# Patient Record
Sex: MALE
[De-identification: names, ages, dates, MRNs, and addresses within clinical notes are randomized per-mention and may not be internally consistent; named-entity substitution may affect disease eponyms.]

---
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
## 2017-04-14 NOTE — ED PDOC
HPI: Back


Time Seen by Provider: 17 01:47


Chief Complaint (Nursing): Back Pain


Chief Complaint (Provider): Back Pain


History Per: Patient


History/Exam Limitations: no limitations


Onset/Duration Of Symptoms: Days (x2)


Current Symptoms Are (Timing): Still Present


Previous Symptoms: Back Pain, Neck Pain, Chronic Pain


Associated Symptoms: None


Additional Complaint(s): 


46 year old male presents to ED with complaints of neck pain x2 days and has a 

past medical history of chronic neck and back pain as well as multiple cervical 

disc herniations. Patient notes that the pain is similar to past episodes, and 

that it responds to muscle relaxers. (-) trauma, numbness/tingling of 

extremities, chest pain, cough, fever, SOB, or difficulty ambulating. 





PCP: [facility]





- Risk Factors


AAA Risk Factors: 


   Neg: Older Than 49 Years Of Age





Past Medical History


Reviewed: Historical Data, Nursing Documentation, Vital Signs


Vital Signs: 


 Last Vital Signs











Temp  98.5 F   17 01:40


 


Pulse  75   17 01:40


 


Resp  16   17 01:40


 


BP  144/87   17 01:40


 


Pulse Ox  99   17 01:40














- Medical History


PMH: Bronchitis, Depression, HTN, Hypercholesterolemia, Kidney Stones, Chronic 

Pain (neck and back)


   Denies: Diabetes, Hepatitis, HIV, Chronic Kidney Disease, Seizures, Sexually 

Transmitted Disease





- Family History


Family History: States: Unknown Family Hx, Hypertension (mother, father)





- Home Medications


Home Medications: 


 Ambulatory Orders











 Medication  Instructions  Recorded


 


Escitalopram [Lexapro] 10 mg PO DAILY 03/05/15


 


amLODIPine [Norvasc] 5 mg PO DAILY 03/05/15


 


Azithromycin [Zithromax Z-Ezequiel] 250 mg PO DAILY #1 tab 08/23/15


 


Naproxen 1 tab PO BID PRN #14 tab 16


 


diaZEpam [Valium] 5 mg PO Q6 PRN #8 tab 16


 


Ibuprofen [Motrin] 600 mg PO Q6 #20 tab 10/12/16


 


oxyCODONE/Acetaminophen [Percocet 1 ea PO Q6 PRN #5 tab 10/12/16





5/325 mg Tab]  


 


Dicyclomine [Bentyl] 20 mg PO TID #21 tab 16


 


Ondansetron ODT [Zofran ODT] 4 mg PO Q8 PRN #10 odt 16


 


Cyclobenzaprine [Cyclobenzaprine 10 mg PO TID PRN #20 tab 17





HCl]  


 


Naproxen [Naprosyn] 500 mg PO BID #20 tab 17


 


traMADol [Ultram] 50 mg PO Q8 PRN #12 tab 17


 


Ketorolac Tromethamine [Toradol] 10 mg PO Q6 #20 tab 02/10/17


 


Oseltamivir Phosphate [Tamiflu] 75 mg PO BID 5 Days 02/10/17


 


Tizanidine HCl [Zanaflex] 4 mg PO BID #30 tablet 02/10/17


 


predniSONE [predniSONE Tab] 60 mg PO DAILY #9 tab 02/10/17


 


Cyclobenzaprine [Cyclobenzaprine 10 mg PO BID PRN #10 tab 17





HCl]  


 


Naproxen [Naprosyn] 500 mg PO Q12 PRN #20 tablet 17


 


amLODIPine [Norvasc] 5 mg PO DAILY #30 tab 17


 


Cyclobenzaprine [Cyclobenzaprine 10 mg PO TID PRN #15 tab 17





HCl]  














- Allergies


Allergies/Adverse Reactions: 


 Allergies











Allergy/AdvReac Type Severity Reaction Status Date / Time


 


No Known Allergies Allergy   Verified 02/10/17 19:32














Review of Systems


ROS Statement: Except As Marked, All Systems Reviewed And Found Negative


Constitutional: Negative for: Fever


Cardiovascular: Negative for: Chest Pain


Respiratory: Negative for: Cough, Shortness of Breath


Musculoskeletal: Positive for: Neck Pain


Neurological: Negative for: Numbness (no numbness/tingling to extremities), 

Incoordination





Physical Exam





- Reviewed


Nursing Documentation Reviewed: Yes


Vital Signs Reviewed: Yes





- Physical Exam


Appears: Positive for: Non-toxic, No Acute Distress


Head Exam: Positive for: ATRAUMATIC


Skin: Positive for: Normal Color, Warm, Dry


Eye Exam: Positive for: Normal appearance


Neck: Positive for: Normal


Respiratory: Negative for: Respiratory Distress


Gastrointestinal/Abdominal: Positive for: Soft.  Negative for: Tenderness


Back: Positive for: Normal Inspection.  Negative for: L CVA Tenderness, R CVA 

Tenderness


Extremity: Positive for: Normal ROM.  Negative for: Deformity


Neurologic/Psych: Positive for: Alert, Oriented.  Negative for: Motor/Sensory 

Deficits





- ECG


O2 Sat by Pulse Oximetry: 99 (RA)


Pulse Ox Interpretation: Normal





Medical Decision Making


Medical Decision Makin


Initial impression: chronic neck pain


Initial plan:


* Flexeril 10mg PO


* Toradol 15mg IM


* Re-eval





[id number]


Upon re-evaluation, patient is feeling better. Patient is stable for discharge 

home. Patient has been prescribed Flexeril and was advised to follow up with 

his pain management specialist.


Dx: chronic neck pain








Scribe Attestation:


Documented by Vilma Mccoy acting as a scribe for Jordan Calvillo MD.


   


MD Scribe Attestation:


All medical record entries made by the Scribe were at my direction and 

personally dictated by me. I have reviewed the chart and agree that the record 

accurately reflects my personal performance of the history, physical exam, 

medical decision making, and the department course for this patient. I have 

also personally directed, reviewed, and agree with the discharge instructions 

and disposition.





Disposition





- Clinical Impression


Clinical Impression: 


 Chronic pain





- Disposition


Referrals: 


Klaudia Medina APN [Primary Care Provider] - 


Disposition: Routine/Home


Disposition Time: 02:36


Condition: IMPROVED


Prescriptions: 


Cyclobenzaprine [Cyclobenzaprine HCl] 10 mg PO TID PRN #15 tab


 PRN Reason: neck pain


Instructions:  Chronic Pain (ED)


Print Language: Austrian
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
## 2017-04-30 NOTE — CT
PROCEDURE:  CT HEAD WITHOUT CONTRAST.



HISTORY:

head injury



COMPARISON:

None available. 



TECHNIQUE:

Axial computed tomography images were obtained through the head/brain 

without intravenous contrast.  



Radiation dose:



Total exam DLP = 880 mGy-cm.



This CT exam was performed using one or more of the following dose 

reduction techniques: Automated exposure control, adjustment of the 

mA and/or kV according to patient size, and/or use of iterative 

reconstruction technique.



FINDINGS:



HEMORRHAGE:

No intracranial hemorrhage. 



BRAIN:

No mass effect or edema.  No atrophy or chronic microvascular 

ischemic changes.



VENTRICLES:

Unremarkable. No hydrocephalus. 



CALVARIUM:

Unremarkable.



PARANASAL SINUSES:

Unremarkable as visualized. No significant inflammatory changes.



MASTOID AIR CELLS:

Unremarkable as visualized. No inflammatory changes.



OTHER FINDINGS:

None.



IMPRESSION:

Normal CT of the Head.
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
## 2017-05-02 NOTE — RAD
PROCEDURE:  Cervical Spine Radiographs. Multiple views of the 

cervical spine performed. Note the study is limited due to 

obscuration of the distal tip of the odontoid by overlying occiput in 

the open-mouth projection. In addition, there is a incomplete 

visualization of the lower cervical segments in the lateral 

projection due to overlying shoulder artifact. 



HISTORY:

Pain. 



COMPARISON:

No dedicated prior cervical spine studies are available however 

correlation made with MRI of the thoracic spine 07/24/2015 which did 

image the cervical spine on sagittal T1  counting series 



FINDINGS:



BONES:

No acute compression fractures no retropulsed fragments. Multilevel 

degenerative spondylosis with varying degrees of disc space narrowing 

large partially bridging/partially bridging anterior osteophyte 

formation.  There also appears to be anterior vertebral body fusion 

changes of the C5 and C6 segments felt to be degenerative in origin 

however clinical correlation with surgical history recommended. Exit 

foramina are poorly seen on the right side due to poor patient 

positioning.  Left-sided exit foramina appear adequate of. Note also 

that odontoid tip is partially obscured by overlying occiput in the 

open-mouth projection 



DISC SPACES:

Normal. 



SOFT TISSUES:

Normal. No prevertebral soft tissue swelling. 



OTHER FINDINGS:

None.



IMPRESSION:

Limited study as above. . Multilevel degenerative spondylosis with 

large anterior bridging osteophyte formation and fusion of the C5 and 

C6 vertebral body segments felt to be degenerative in origin however 

clinical correlation with surgical history recommended. 



If symptoms persist or occult fracture suspected clinically recommend 

followup CT scan of the cervical spine.

## 2017-12-06 NOTE — ED PDOC
HPI: General Adult


Chief Complaint (Nursing): ENT Problem


Chief Complaint (Provider): ENT Problem


History Per: Patient


Onset/Duration Of Symptoms: Other (x 2 weeks)


Additional Complaint(s): 





Juan is a 47 year old male with a past medical history of bipolar disorder who 

was accompanied by wife presents to the Emergency Department complaining of 

left ear pain for 2 weeks. Patient states he used hydrogen peroxide for a few 

days. Patient is also complaining of lower back pain-states he has 2-3 

herniated discs, was discharged from pain [facility] in the past and has had trouble 

getting Rx for stronger pain control from medical professional. denies 

radiation of pain to lower extremity, urinary incontinence or bowel 

incontinence denies abd pain  Patient denies fever, throat pain, neck pain, 

abdominal pain, dysuria and hematuria. 











PMD: Wilmer Mejia





Past Medical History


Reviewed: Historical Data, Nursing Documentation, Vital Signs


Vital Signs: 


 Last Vital Signs











Temp  99.2 F   12/06/17 13:14


 


Pulse  90   12/06/17 13:14


 


Resp  20   12/06/17 13:14


 


BP  127/80   12/06/17 13:14


 


Pulse Ox  97   12/06/17 15:02














- Medical History


PMH: Bronchitis, Depression, HTN, Hypercholesterolemia, Kidney Stones, Chronic 

Pain (neck and back)


   Denies: Diabetes, Hepatitis, HIV, Chronic Kidney Disease, Seizures, Sexually 

Transmitted Disease





- Surgical History


Surgical History: No Surg Hx





- Family History


Family History: States: Unknown Family Hx, Hypertension (mother, father)





- Immunization History


Hx Tetanus Toxoid Vaccination: Yes





- Home Medications


Home Medications: 


 Ambulatory Orders











 Medication  Instructions  Recorded


 


Escitalopram [Lexapro] 10 mg PO DAILY 03/05/15


 


amLODIPine [Norvasc] 5 mg PO DAILY 03/05/15


 


Azithromycin [Zithromax Z-Ezequiel] 250 mg PO DAILY #1 tab 08/23/15


 


Naproxen 1 tab PO BID PRN #14 tab 08/18/16


 


diaZEpam [Valium] 5 mg PO Q6 PRN #8 tab 08/18/16


 


Ibuprofen [Motrin] 600 mg PO Q6 #20 tab 10/12/16


 


oxyCODONE/Acetaminophen [Percocet 1 ea PO Q6 PRN #5 tab 10/12/16





5/325 mg Tab]  


 


Dicyclomine [Bentyl] 20 mg PO TID #21 tab 12/29/16


 


Ondansetron ODT [Zofran ODT] 4 mg PO Q8 PRN #10 odt 12/29/16


 


Cyclobenzaprine [Cyclobenzaprine 10 mg PO TID PRN #20 tab 01/22/17





HCl]  


 


Naproxen [Naprosyn] 500 mg PO BID #20 tab 01/22/17


 


traMADol [Ultram] 50 mg PO Q8 PRN #12 tab 02/01/17


 


Ketorolac Tromethamine [Toradol] 10 mg PO Q6 #20 tab 02/10/17


 


Oseltamivir Phosphate [Tamiflu] 75 mg PO BID 5 Days  capsule 02/10/17


 


Tizanidine HCl [Zanaflex] 4 mg PO BID #30 tablet 02/10/17


 


predniSONE [predniSONE Tab] 60 mg PO DAILY #9 tab 02/10/17


 


Cyclobenzaprine [Cyclobenzaprine 10 mg PO BID PRN #10 tab 03/16/17





HCl]  


 


Naproxen [Naprosyn] 500 mg PO Q12 PRN #20 tablet 03/16/17


 


amLODIPine [Norvasc] 5 mg PO DAILY #30 tab 03/16/17


 


traMADol [Ultram] 50 mg PO TID PRN #12 tab 04/14/17


 


Naproxen [Naprosyn] 500 mg PO BID PRN #20 tablet 04/30/17


 


traMADol [Ultram] 50 mg PO QID #10 tab 04/30/17


 


Cyclobenzaprine [Cyclobenzaprine 10 mg PO BID #14 tab 05/01/17





HCl]  


 


Ibuprofen [Motrin] 400 mg PO Q6 #30 tab 05/01/17


 


Ciprofloxacin/Dexamethasone 1 - 2 drop OD BID #1 bottle 12/06/17





[Ciprodex Otic]  














- Allergies


Allergies/Adverse Reactions: 


 Allergies











Allergy/AdvReac Type Severity Reaction Status Date / Time


 


No Known Allergies Allergy   Verified 05/01/17 20:36














Review of Systems


ROS Statement: Except As Marked, All Systems Reviewed And Found Negative


Constitutional: Negative for: Fever


ENT: Positive for: Ear Pain.  Negative for: Throat Pain


Gastrointestinal: Negative for: Abdominal Pain


Musculoskeletal: Positive for: Back Pain (Chronic Lower).  Negative for: Neck 

Pain





Physical Exam





- Reviewed


Nursing Documentation Reviewed: Yes


Vital Signs Reviewed: Yes





- Physical Exam


ENT: Positive for: Other (See comments)


Back: Positive for: Muscle Spasm, Other (Mild Paraspinal Tenderness Lumbar Spine

).  Negative for: L CVA Tenderness, R CVA Tenderness


Lymphatic: Negative for: Other (Lymph Node Tenderness)


Neurologic/Psych: Positive for: Alert


Comments: 





Ears:


Left- Otorrhea, Non-Tender Pinea Tragus





- ECG


O2 Sat by Pulse Oximetry: 97 (RA)


Pulse Ox Interpretation: Normal





Medical Decision Making


Medical Decision Making: 





Time: 13:59


Plan:


Patient was agitated and was verbally aggressive to nursing staff. Security 

involved and pt was verbally redirected. 





PT given:


- Toradol 30 mg IM STAT








Time: 14:00





Upon provider evaluation patient is medically stable, and requires no further 

treatment in the ED at this time. Patient will be discharged with Rx for 

Ciprodex Otic of otitis externa and advised to f.u with pain mgnt. 


advised that chronic pain needs to be managed with a pain specialist. 


pt provided understanding. 





Counseling was provided and all questions were answered regarding diagnosis. 

There is agreement to discharge plan. Return if symptoms persist or worsen.





--------------------------------------------------------------------------------

-----------------


Scribe Attestation:


Documented by Josh Mistry, acting as a scribe for Cristina Mena PA-C





Provider Scribe Attestation:


All medical record entries made by the Scribe were at my direction and 

personally dictated by me. I have reviewed the chart and agree that the record 

accurately reflects my personal performance of the history, physical exam, 

medical decision making, and the department course for this patient. I have 

also personally directed, reviewed, and agree with the discharge instructions 

and disposition.





Disposition





- Clinical Impression


Clinical Impression: 


 Left ear pain, Chronic pain








- Disposition


Referrals: 


Formerly [facility], [facility] [Outside]


Disposition: Routine/Home


Disposition Time: 17:24


Condition: STABLE


Prescriptions: 


Ciprofloxacin/Dexamethasone [Ciprodex Otic] 1 - 2 drop OD BID #1 bottle


Instructions:  Earache (ED), Back Pain (ED)


Forms:  [facility] (English)

## 2018-01-31 NOTE — RAD
EXAM:

  XR Cervical Spine, 6 or More Views



CLINICAL HISTORY:

  47 years old, male; Pain; Neck pain



TECHNIQUE:

  Frontal, lateral, oblique and flexion/extension views of the cervical spine.



COMPARISON:

  CR - CERVICAL SPINE COMPLETE 2017-05-01 21:43



FINDINGS:

  Vertebrae:  There are large bridging anterior osteophytes at multiple levels 

from C2-C6. No acute fracture from C1 to the top of C7. C7 is partially 

visualized on the swimmer's view.  Normal alignment.

  Disc spaces: There is fusion of the C5 and 6 vertebral bodies.

  Soft tissues:  Unremarkable. No prevertebral soft tissue swelling.



IMPRESSION:     

No acute fracture or dislocation. Degenerative changes as described.

## 2018-01-31 NOTE — ED PDOC
HPI: Back


Time Seen by Provider: 01/31/18 05:39


Chief Complaint (Nursing): Back Pain


Chief Complaint (Provider): Neck Pain, Back Pain


History Per: Patient


History/Exam Limitations: no limitations


Onset/Duration Of Symptoms: Days (x 2 weeks)


Current Symptoms Are (Timing): Still Present


Previous Symptoms: Back Pain


Additional Complaint(s): 


 Juan Perry is a 47-year-old male with a past medical history of multiple 

herniated discs and chronic back pain, now presenting with neck pain radiating 

down his back. Denies any recent re-injury. He states the pain feels as if it 

is spreading upwards. Denies any focal weakness or numbness. 





PMD: Dr. Anita Stewart 





Past Medical History


Reviewed: Historical Data, Nursing Documentation, Vital Signs


Vital Signs: 


 Last Vital Signs











Temp  98.1 F   01/31/18 05:47


 


Pulse  70   01/31/18 05:47


 


Resp  18   01/31/18 05:47


 


BP  131/83   01/31/18 05:47


 


Pulse Ox  99   01/31/18 05:47














- Medical History


PMH: Bronchitis, Depression, HTN, Hypercholesterolemia, Kidney Stones, Chronic 

Pain (neck and back)


   Denies: Diabetes, Hepatitis, HIV, Chronic Kidney Disease, Seizures, Sexually 

Transmitted Disease





- Surgical History


Surgical History: No Surg Hx





- Family History


Family History: States: Unknown Family Hx, Hypertension (mother, father)





- Social History


Current smoker - smoking cessation education provided: No


Ex-Smoker (has not smoked in the last 12 months): Yes


Alcohol: Social


Drugs: Denies





- Immunization History


Hx Tetanus Toxoid Vaccination: Yes





- Home Medications


Home Medications: 


 Ambulatory Orders











 Medication  Instructions  Recorded


 


Escitalopram [Lexapro] 10 mg PO DAILY 03/05/15


 


amLODIPine [Norvasc] 5 mg PO DAILY 03/05/15


 


Azithromycin [Zithromax Z-Ezequiel] 250 mg PO DAILY #1 tab 08/23/15


 


Naproxen 1 tab PO BID PRN #14 tab 08/18/16


 


diaZEpam [Valium] 5 mg PO Q6 PRN #8 tab 08/18/16


 


Ibuprofen [Motrin] 600 mg PO Q6 #20 tab 10/12/16


 


oxyCODONE/Acetaminophen [Percocet 1 ea PO Q6 PRN #5 tab 10/12/16





5/325 mg Tab]  


 


Dicyclomine [Bentyl] 20 mg PO TID #21 tab 12/29/16


 


Ondansetron ODT [Zofran ODT] 4 mg PO Q8 PRN #10 odt 12/29/16


 


Cyclobenzaprine [Cyclobenzaprine 10 mg PO TID PRN #20 tab 01/22/17





HCl]  


 


Naproxen [Naprosyn] 500 mg PO BID #20 tab 01/22/17


 


traMADol [Ultram] 50 mg PO Q8 PRN #12 tab 02/01/17


 


Ketorolac Tromethamine [Toradol] 10 mg PO Q6 #20 tab 02/10/17


 


Oseltamivir Phosphate [Tamiflu] 75 mg PO BID 5 Days  capsule 02/10/17


 


Tizanidine HCl [Zanaflex] 4 mg PO BID #30 tablet 02/10/17


 


predniSONE [predniSONE Tab] 60 mg PO DAILY #9 tab 02/10/17


 


Cyclobenzaprine [Cyclobenzaprine 10 mg PO BID PRN #10 tab 03/16/17





HCl]  


 


Naproxen [Naprosyn] 500 mg PO Q12 PRN #20 tablet 03/16/17


 


amLODIPine [Norvasc] 5 mg PO DAILY #30 tab 03/16/17


 


traMADol [Ultram] 50 mg PO TID PRN #12 tab 04/14/17


 


Naproxen [Naprosyn] 500 mg PO BID PRN #20 tablet 04/30/17


 


traMADol [Ultram] 50 mg PO QID #10 tab 04/30/17


 


Cyclobenzaprine [Cyclobenzaprine 10 mg PO BID #14 tab 05/01/17





HCl]  


 


Ibuprofen [Motrin] 400 mg PO Q6 #30 tab 05/01/17


 


Ciprofloxacin/Dexamethasone 1 - 2 drop OD BID #1 bottle 12/06/17





[Ciprodex Otic]  


 


Cyclobenzaprine [Cyclobenzaprine 10 mg PO BID #15 tab 01/31/18





HCl]  


 


Ketorolac Tromethamine [Toradol] 10 mg PO BID #20 tab 01/31/18














- Allergies


Allergies/Adverse Reactions: 


 Allergies











Allergy/AdvReac Type Severity Reaction Status Date / Time


 


No Known Allergies Allergy   Verified 05/01/17 20:36














Review of Systems


ROS Statement: Except As Marked, All Systems Reviewed And Found Negative


Musculoskeletal: Positive for: Neck Pain, Back Pain


Neurological: Negative for: Weakness, Numbness (and tingling)





Physical Exam





- Reviewed


Nursing Documentation Reviewed: Yes


Vital Signs Reviewed: Yes





- Physical Exam


Appears: Positive for: Non-toxic, No Acute Distress


Head Exam: Positive for: ATRAUMATIC, NORMOCEPHALIC


Skin: Positive for: Normal Color, Warm, Dry


Eye Exam: Positive for: EOMI, Normal appearance, PERRL


Neck: Positive for: Pain On Movement Of Neck


Cardiovascular/Chest: Positive for: Regular Rate, Rhythm.  Negative for: Murmur


Respiratory: Positive for: Normal Breath Sounds.  Negative for: Accessory 

Muscle Use, Respiratory Distress


Gastrointestinal/Abdominal: Positive for: Normal Exam, Soft.  Negative for: 

Tenderness


Back: Positive for: Other (paravertebral muscular tenderness on palpation at 

cervical, thoracic, and lumbar regions).  Negative for: Vertebral Tenderness


Extremity: Positive for: Normal ROM, Other (no saddle anesthesia, sensation 

intact).  Negative for: Deformity


Neurologic/Psych: Positive for: Alert, Oriented.  Negative for: Motor/Sensory 

Deficits





- ECG


O2 Sat by Pulse Oximetry: 99 (RA)


Pulse Ox Interpretation: Normal





Medical Decision Making


Medical Decision Making: 


Time: 5:59


Initial Impression: Musculoskeletal back pain 


Plan:


--X-Ray Cervical Spine


--Flexeril 10 mg PO


--Toradol 30 mg IM





X-Ray C-Spine:


FINDINGS:


Vertebrae: There are large bridging anterior osteophytes at multiple levels 

from C2-C6. No acute


fracture from C1 to the top of C7. C7 is partially visualized on the swimmer's 

view. Normal alignment.


Disc spaces: There is fusion of the C5 and 6 vertebral bodies.


Soft tissues: Unremarkable. No prevertebral soft tissue swelling.





IMPRESSION:


No acute fracture or dislocation. Degenerative changes as described.


Thank you for allowing us to participate in the care of your patient.


Dictated and Authenticated by: Monse Gudino MD


01/31/2018 6:55 AM Eastern Time ([place] & [place])








Patient is medically stable for discharge home. Will d/c with Rx for Toradol 

and Flexeril. Counseling was provided and all questions were answered regarding 

diagnosis and need for follow up with PMD. There is agreement to discharge 

plan. Return if symptoms persist or worsen.





--------------------------------------------------------------------------------

-----------------


Scribe Attestation:


Documented by Bette Sewell, acting as a scribe for Ayden Barber MD





Provider Scribe Attestation:


All medical record entries made by the Scribe were at my direction and 

personally dictated by me. I have reviewed the chart and agree that the record 

accurately reflects my personal performance of the history, physical exam, 

medical decision making, and the department course for this patient. I have 

also personally directed, reviewed, and agree with the discharge instructions 

and disposition. 





Disposition





- Clinical Impression


Clinical Impression: 


 Upper back strain








- Patient ED Disposition


Is Patient to be Admitted: No


Counseled Patient/Family Regarding: Studies Performed, Diagnosis, Need For 

Followup, Rx Given





- Disposition


Referrals: 


Anita Reinoso ARNP [Primary Care Provider] - 


Disposition: Routine/Home


Disposition Time: 06:59


Condition: STABLE


Prescriptions: 


Cyclobenzaprine [Cyclobenzaprine HCl] 10 mg PO BID #15 tab


Ketorolac Tromethamine [Toradol] 10 mg PO BID #20 tab


Instructions:  Cervical Strain (DC), Degenerative Disc Disease (ED)


Forms:  [facility] (English)


Print Language: Chilean





- POA


Present On Arrival: None

## 2018-02-12 ENCOUNTER — HOSPITAL ENCOUNTER (EMERGENCY)
Dept: HOSPITAL 14 - H.ER | Age: 48
Discharge: HOME | End: 2018-02-12
Payer: COMMERCIAL

## 2018-02-12 VITALS
DIASTOLIC BLOOD PRESSURE: 85 MMHG | SYSTOLIC BLOOD PRESSURE: 133 MMHG | OXYGEN SATURATION: 98 % | HEART RATE: 84 BPM | TEMPERATURE: 97.8 F | RESPIRATION RATE: 20 BRPM

## 2018-02-12 VITALS — BODY MASS INDEX: 35.4 KG/M2

## 2018-02-12 DIAGNOSIS — M54.2: ICD-10-CM

## 2018-02-12 DIAGNOSIS — M54.9: Primary | ICD-10-CM

## 2018-02-12 PROCEDURE — 96372 THER/PROPH/DIAG INJ SC/IM: CPT

## 2018-02-12 PROCEDURE — 99282 EMERGENCY DEPT VISIT SF MDM: CPT

## 2018-02-12 PROCEDURE — 82948 REAGENT STRIP/BLOOD GLUCOSE: CPT
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
## 2018-03-12 ENCOUNTER — HOSPITAL ENCOUNTER (OUTPATIENT)
Dept: HOSPITAL 14 - H.OPSURG | Age: 48
Discharge: HOME | End: 2018-03-12
Attending: ANESTHESIOLOGY
Payer: COMMERCIAL

## 2018-03-12 VITALS
SYSTOLIC BLOOD PRESSURE: 141 MMHG | HEART RATE: 76 BPM | TEMPERATURE: 97.6 F | DIASTOLIC BLOOD PRESSURE: 90 MMHG | OXYGEN SATURATION: 98 %

## 2018-03-12 VITALS — BODY MASS INDEX: 38.9 KG/M2

## 2018-03-12 VITALS — RESPIRATION RATE: 18 BRPM

## 2018-03-12 DIAGNOSIS — M54.16: Primary | ICD-10-CM

## 2018-03-12 PROCEDURE — 62323 NJX INTERLAMINAR LMBR/SAC: CPT

## 2018-03-12 RX ADMIN — HYDROMORPHONE HYDROCHLORIDE PRN MG: 1 INJECTION, SOLUTION INTRAMUSCULAR; INTRAVENOUS; SUBCUTANEOUS at 13:50

## 2018-03-12 RX ADMIN — HYDROMORPHONE HYDROCHLORIDE PRN MG: 1 INJECTION, SOLUTION INTRAMUSCULAR; INTRAVENOUS; SUBCUTANEOUS at 14:15

## 2018-03-12 NOTE — RAD
PROCEDURE:  Intraoperative Fluoroscopy. 



HISTORY:

PAIN MANAGEMENT



FINDINGS:

Fluoroscopic assistance was provided. 34.2 seconds fluoroscopy time 

utilized during this procedure.  Radiation dose = 9.67 mGy 



Please refer to the operative report from JOSE Montoya.

## 2018-03-12 NOTE — OP
PROCEDURE DATE: 3/12/18



PREOPERATIVE DIAGNOSIS:  Lumbar radiculopathy.



POSTOPERATIVE DIAGNOSIS:  Lumbar radiculopathy.



PROCEDURE:  Caudal epidural with x-ray guidance.



EXPECTED BLOOD LOSS:  None.



COMPLICATIONS:  None.



DESCRIPTION OF PROCEDURE:  After informed consent was obtained, the patient

was brought to the OR and placed on the table in prone position.  All

pressure points were padded, sedation was administered by anesthesia. 

Lumbosacral spine was prepped with iodine x3 and draped in normal sterile

fashion.  A 3 mL of 1% lidocaine was used to create a skin wheal using a

25-gauge needle.  A 22-gauge Tuohy epidural needle was advanced under

lateral view to rocha the sacrococcygeal ligament.  There was no

paresthesia during placement of the needle.  After negative aspiration for

heme or CSF, 2 mL of contrast was used to delineate the epidural space. 

After negative aspiration for heme or CSF, 20 mL of 0.5% lidocaine and

0.25% bupivacaine with Depo-Medrol was easily instilled.  An 80 mg of

Depo-Medrol was used for the case.  There was no paresthesia during the

case.  The patient was taken to recovery room with bilateral lower

extremity motor and sensory intact.







__________________________________________

Jose De Jesus Brown MD





DD:  03/12/2018 13:29:56

DT:  03/12/2018 15:04:41

Job # [id number]





EULALIO

## 2018-03-14 ENCOUNTER — HOSPITAL ENCOUNTER (EMERGENCY)
Dept: HOSPITAL 14 - H.ER | Age: 48
Discharge: HOME | End: 2018-03-14
Payer: COMMERCIAL

## 2018-03-14 VITALS
DIASTOLIC BLOOD PRESSURE: 93 MMHG | HEART RATE: 91 BPM | TEMPERATURE: 98.2 F | SYSTOLIC BLOOD PRESSURE: 157 MMHG | RESPIRATION RATE: 16 BRPM | OXYGEN SATURATION: 99 %

## 2018-03-14 VITALS — BODY MASS INDEX: 38.9 KG/M2

## 2018-03-14 DIAGNOSIS — Z87.442: ICD-10-CM

## 2018-03-14 DIAGNOSIS — M54.2: ICD-10-CM

## 2018-03-14 DIAGNOSIS — M47.817: ICD-10-CM

## 2018-03-14 DIAGNOSIS — I10: ICD-10-CM

## 2018-03-14 DIAGNOSIS — G89.29: ICD-10-CM

## 2018-03-14 DIAGNOSIS — Z86.59: ICD-10-CM

## 2018-03-14 DIAGNOSIS — M54.5: Primary | ICD-10-CM

## 2018-03-14 LAB
ALBUMIN SERPL-MCNC: 4.7 G/DL (ref 3.5–5)
ALBUMIN/GLOB SERPL: 1.3 {RATIO} (ref 1–2.1)
ALT SERPL-CCNC: 28 U/L (ref 21–72)
AST SERPL-CCNC: 38 U/L (ref 17–59)
BASOPHILS # BLD AUTO: 0.1 K/UL (ref 0–0.2)
BASOPHILS NFR BLD: 0.9 % (ref 0–2)
BUN SERPL-MCNC: 24 MG/DL (ref 9–20)
CALCIUM SERPL-MCNC: 9.3 MG/DL (ref 8.4–10.2)
EOSINOPHIL # BLD AUTO: 0 K/UL (ref 0–0.7)
EOSINOPHIL NFR BLD: 0.5 % (ref 0–4)
ERYTHROCYTE [DISTWIDTH] IN BLOOD BY AUTOMATED COUNT: 13.5 % (ref 11.5–14.5)
GFR NON-AFRICAN AMERICAN: > 60
HGB BLD-MCNC: 14.3 G/DL (ref 12–18)
LYMPHOCYTES # BLD AUTO: 1.8 K/UL (ref 1–4.3)
LYMPHOCYTES NFR BLD AUTO: 26.2 % (ref 20–40)
MCH RBC QN AUTO: 30.9 PG (ref 27–31)
MCHC RBC AUTO-ENTMCNC: 33.7 G/DL (ref 33–37)
MCV RBC AUTO: 91.8 FL (ref 80–94)
MONOCYTES # BLD: 0.4 K/UL (ref 0–0.8)
MONOCYTES NFR BLD: 6.1 % (ref 0–10)
NEUTROPHILS # BLD: 4.6 K/UL (ref 1.8–7)
NEUTROPHILS NFR BLD AUTO: 66.3 % (ref 50–75)
NRBC BLD AUTO-RTO: 0.1 % (ref 0–0)
PLATELET # BLD: 240 K/UL (ref 130–400)
PMV BLD AUTO: 8.2 FL (ref 7.2–11.7)
RBC # BLD AUTO: 4.63 MIL/UL (ref 4.4–5.9)
WBC # BLD AUTO: 6.9 K/UL (ref 4.8–10.8)

## 2018-03-14 PROCEDURE — 85025 COMPLETE CBC W/AUTO DIFF WBC: CPT

## 2018-03-14 PROCEDURE — 96374 THER/PROPH/DIAG INJ IV PUSH: CPT

## 2018-03-14 PROCEDURE — 99282 EMERGENCY DEPT VISIT SF MDM: CPT

## 2018-03-14 PROCEDURE — 85651 RBC SED RATE NONAUTOMATED: CPT

## 2018-03-14 PROCEDURE — 80053 COMPREHEN METABOLIC PANEL: CPT
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
## 2018-10-08 ENCOUNTER — HOSPITAL ENCOUNTER (EMERGENCY)
Dept: HOSPITAL 14 - H.ER | Age: 48
Discharge: HOME | End: 2018-10-08
Payer: COMMERCIAL

## 2018-10-08 VITALS
DIASTOLIC BLOOD PRESSURE: 83 MMHG | HEART RATE: 90 BPM | RESPIRATION RATE: 18 BRPM | TEMPERATURE: 99.6 F | OXYGEN SATURATION: 99 % | SYSTOLIC BLOOD PRESSURE: 139 MMHG

## 2018-10-08 VITALS — BODY MASS INDEX: 38.9 KG/M2

## 2018-10-08 DIAGNOSIS — J06.9: Primary | ICD-10-CM

## 2018-10-08 DIAGNOSIS — G89.29: ICD-10-CM

## 2018-10-08 DIAGNOSIS — I10: ICD-10-CM

## 2018-10-08 DIAGNOSIS — E78.00: ICD-10-CM

## 2018-10-08 NOTE — RAD
Date of service: 



10/08/2018



HISTORY:

 productive cough r/o pneumonia 



COMPARISON:

08/22/2015



TECHNIQUE:

Chest PA and lateral



FINDINGS:



LUNGS:

No active pulmonary disease.



PLEURA:

No significant pleural effusion identified. No pneumothorax apparent.



CARDIOVASCULAR:

 No radiographic findings to suggest acute or significant 

cardiovascular disease.



OSSEOUS STRUCTURES:

No significant abnormalities.



VISUALIZED UPPER ABDOMEN:

Normal.



OTHER FINDINGS:

None.



IMPRESSION:

No active disease. No significant interval change compared to the 

prior examination(s).

## 2018-10-08 NOTE — ED PDOC
HPI: CCC, URI, Sore Throat


Chief Complaint (Provider): Cough


History Per: Patient


History/Exam Limitations: no limitations


Onset/Duration Of Symptoms: Days (x4)


Current Symptoms Are (Timing): Still Present


Additional Complaint(s): 


48 year old male presents to the ED for evaluation of a cough productive of 

yellow sputum for the last four days associated with subjective fevers and back 

pain described as a burning feeling. He notes the back pain is only while 

coughing and does not have it at rest. Reports taking Mucinex every six hours 

which has helped with sinus congestion. Otherwise denies chills, shortness of 

breath, chest pain, abdominal pain, nausea, vomiting, diarrhea, rash, and 

urinary sx.





PMD: none provided





<Baylee Landin - Last Filed: 10/09/18 23:50>





<Maggy Jones - Last Filed: 10/10/18 17:11>


Time Seen by Provider: 10/08/18 16:24


Chief Complaint (Nursing): Flu-like Symptoms





Supervising Attending Note





- Attestation:


I have personally seen and examined this patient.: No


I have reviewed all pertinent clinical information, including history, physical 

exam and plan: Yes





<Maggy Jones - Last Filed: 10/10/18 17:11>





Past Medical History


Reviewed: Historical Data, Nursing Documentation, Vital Signs


Vital Signs: 





                                Last Vital Signs











Temp  99.6 F   10/08/18 15:56


 


Pulse  90   10/08/18 15:56


 


Resp  18   10/08/18 15:56


 


BP  139/83   10/08/18 15:56


 


Pulse Ox  99   10/08/18 15:56














- Medical History


PMH: Bronchitis, Depression, HTN, Hypercholesterolemia, Kidney Stones, Chronic 

Pain (neck and back)


   Denies: Diabetes, Hepatitis, HIV, Chronic Kidney Disease, Seizures, Sexually 

Transmitted Disease





- Surgical History


Other surgeries: right ankle





- Family History


Family History: States: Unknown Family Hx, Hypertension (mother, father)





- Social History


Ex-Smoker (has not smoked in the last 12 months): Yes


Alcohol: Social


Drugs: Denies





- Immunization History


Hx Tetanus Toxoid Vaccination: Yes





<Baylee Landin - Last Filed: 10/09/18 23:50>


Vital Signs: 





                                Last Vital Signs











Temp  99.6 F   10/08/18 15:56


 


Pulse  90   10/08/18 15:56


 


Resp  18   10/08/18 15:56


 


BP  139/83   10/08/18 15:56


 


Pulse Ox  99   10/09/18 23:51














<Maggy Jones - Last Filed: 10/10/18 17:11>





- Home Medications


Home Medications: 


                                Ambulatory Orders











 Medication  Instructions  Recorded


 


RX: traMADol [Ultram] 50 mg PO Q8 PRN #12 tab 02/01/17


 


Cyclobenzaprine [Cyclobenzaprine 10 mg PO BID PRN #10 tab 03/16/17





HCl]  


 


Acetaminophen with Codeine 1 tab PO Q6 PRN #10 tab 02/12/18





[Tylenol with Codeine No. 3 300  





mg-30 mg]  


 


Alprazolam [Xanax] 0.5 mg PO DAILY 03/12/18


 


Ciprofloxacin/Dexamethasone 1 - 2 drop OD BID PRN 03/12/18





[Ciprodex Otic]  


 


Hydrochlorothiazide [Microzide] 12.5 mg PO DAILY 03/12/18


 


RX: Losartan Potassium 50 mg PO DAILY 03/12/18


 


Benzonatate [Tessalon Perle] 100 mg PO Q8H PRN #21 capsule 10/08/18


 


RX: Albuterol HFA [Ventolin HFA 90 2 puff IH Q6H PRN #60 puff 10/08/18





mcg/actuation (8 g)]  


 


Atorvastatin [Lipitor] 40 mg PO 10/10/18


 


Escitalopram [Lexapro] 10 mg PO Q12H 10/10/18














- Allergies


Allergies/Adverse Reactions: 


                                    Allergies











Allergy/AdvReac Type Severity Reaction Status Date / Time


 


No Known Allergies Allergy   Verified 10/09/18 17:27














Review of Systems


ROS Statement: Except As Marked, All Systems Reviewed And Found Negative


Constitutional: Positive for: Fever (subjective).  Negative for: Chills


ENT: Positive for: Nose Congestion


Cardiovascular: Negative for: Chest Pain


Respiratory: Negative for: Shortness of Breath


Gastrointestinal: Negative for: Nausea, Vomiting, Abdominal Pain, Diarrhea


Genitourinary Male: Negative for: Dysuria, Frequency


Musculoskeletal: Positive for: Back Pain (while coughing)


Skin: Negative for: Rash





<Baylee Landin - Last Filed: 10/09/18 23:50>





Physical Exam





- Reviewed


Nursing Documentation Reviewed: Yes


Vital Signs Reviewed: Yes





- Physical Exam


Appears: Positive for: Well, Non-toxic, No Acute Distress (no respiratory 

distress)


Head Exam: Positive for: ATRAUMATIC, NORMOCEPHALIC


Skin: Positive for: Normal Color, Warm, Dry


Eye Exam: Positive for: Normal appearance


ENT: Positive for: Normal ENT Inspection


Neck: Positive for: Normal, Painless ROM, Supple


Cardiovascular/Chest: Positive for: Regular Rate, Rhythm


Respiratory: Positive for: Wheezing (scattered diffuse expiratory).  Negative 

for: Respiratory Distress


Gastrointestinal/Abdominal: Positive for: Normal Exam, Soft.  Negative for: 

Tenderness


Back: Positive for: Normal Inspection


Neurologic/Psych: Positive for: Alert, Oriented (x3)





<Baylee Landin - Last Filed: 10/09/18 23:50>





- ECG


O2 Sat by Pulse Oximetry: 99 (RA)


Pulse Ox Interpretation: Normal





<Baylee Landin - Last Filed: 10/09/18 23:50>





Medical Decision Making


Medical Decision Making: 


Time: 1642


Initial Impression: acute bronchitis


Initial Plan:


--CXR


--Duoneb 9ml INH


--Ibuprofen 400mg PO


--Peak flow pre/post tx





CXR negative as read by me.





1850


Pt reevaled after Duoneb and he reports reduction in cough and less difficulty 

taking deep breaths. Given tessalon perles and an albuterol inhaler and advised 

to increase fluid intake and continue Mucinex. Instructed to follow up with PMD 

or return to ED if sx worsen.





 

--------------------------------------------------------------------------------


-----------------


Scribe Attestation:


Documented by Mary Anne Reyes, acting as a scribe for Baylee Landin PA-C.


Provider Scribe Attestation:


All medical record entries made by the Scribe were at my direction and 

personally dictated by me. I have reviewed the chart and agree that the record 

accurately reflects my personal performance of the history, physical exam, 

medical decision making, and the department course for this patient. I have also

 personally directed, reviewed, and agree with the discharge instructions and 

disposition.





<Baylee Landin - Last Filed: 10/09/18 23:50>





Disposition





- Patient ED Disposition


Is Patient to be Admitted: No





- Disposition


Disposition: Routine/Home


Disposition Time: 18:45





<Baylee Landin - Last Filed: 10/09/18 23:50>





<Maggy Jones - Last Filed: 10/10/18 17:11>





- Clinical Impression


Clinical Impression: 


 Viral URI








- Disposition


Referrals: 


[facility] [Outside]


Condition: IMPROVED


Additional Instructions: 


Take cough medicine once every 8 hours as needed for cough


Use inahler 2 puffs every 4-6 hours as needed 


Take tylenol/ibuprofen for headache


increase fluids


Followup with primary doctor within 2 days


Return to ED if symptoms persist or worsen


Prescriptions: 


RX: Albuterol HFA [Ventolin HFA 90 mcg/actuation (8 g)] 2 puff IH Q6H PRN #60 

puff


 PRN Reason: Cough


Benzonatate [Tessalon Perle] 100 mg PO Q8H PRN #21 capsule


 PRN Reason: Cough


Forms:  [facility] (English), [facility] ED School/Work Excuse

## 2018-10-09 ENCOUNTER — HOSPITAL ENCOUNTER (INPATIENT)
Dept: HOSPITAL 14 - H.ER | Age: 48
LOS: 2 days | Discharge: HOME | DRG: 97 | End: 2018-10-11
Attending: FAMILY MEDICINE | Admitting: FAMILY MEDICINE
Payer: COMMERCIAL

## 2018-10-09 VITALS — BODY MASS INDEX: 38.9 KG/M2

## 2018-10-09 DIAGNOSIS — E78.00: ICD-10-CM

## 2018-10-09 DIAGNOSIS — E78.5: ICD-10-CM

## 2018-10-09 DIAGNOSIS — H91.90: ICD-10-CM

## 2018-10-09 DIAGNOSIS — R55: ICD-10-CM

## 2018-10-09 DIAGNOSIS — I10: ICD-10-CM

## 2018-10-09 DIAGNOSIS — F32.9: ICD-10-CM

## 2018-10-09 DIAGNOSIS — G89.29: ICD-10-CM

## 2018-10-09 DIAGNOSIS — F41.9: ICD-10-CM

## 2018-10-09 DIAGNOSIS — Z23: ICD-10-CM

## 2018-10-09 DIAGNOSIS — Z87.891: ICD-10-CM

## 2018-10-09 DIAGNOSIS — J20.9: Primary | ICD-10-CM

## 2018-10-09 DIAGNOSIS — Z86.711: ICD-10-CM

## 2018-10-09 DIAGNOSIS — Z86.718: ICD-10-CM

## 2018-10-09 DIAGNOSIS — J32.0: ICD-10-CM

## 2018-10-09 LAB
ALBUMIN SERPL-MCNC: 4.5 G/DL (ref 3.5–5)
ALBUMIN/GLOB SERPL: 1.3 {RATIO} (ref 1–2.1)
ALT SERPL-CCNC: 45 U/L (ref 21–72)
AST SERPL-CCNC: 55 U/L (ref 17–59)
BASE EXCESS BLDV CALC-SCNC: 0.6 MMOL/L (ref 0–2)
BASE EXCESS BLDV CALC-SCNC: 4 MMOL/L (ref 0–2)
BASOPHILS # BLD AUTO: 0 K/UL (ref 0–0.2)
BASOPHILS NFR BLD: 0.5 % (ref 0–2)
BUN SERPL-MCNC: 17 MG/DL (ref 9–20)
CALCIUM SERPL-MCNC: 9.3 MG/DL (ref 8.4–10.2)
EOSINOPHIL # BLD AUTO: 0.1 K/UL (ref 0–0.7)
EOSINOPHIL NFR BLD: 1.6 % (ref 0–4)
ERYTHROCYTE [DISTWIDTH] IN BLOOD BY AUTOMATED COUNT: 13.6 % (ref 11.5–14.5)
GFR NON-AFRICAN AMERICAN: > 60
HGB BLD-MCNC: 14.4 G/DL (ref 12–18)
LYMPHOCYTES # BLD AUTO: 1 K/UL (ref 1–4.3)
LYMPHOCYTES NFR BLD AUTO: 25 % (ref 20–40)
MCH RBC QN AUTO: 31.4 PG (ref 27–31)
MCHC RBC AUTO-ENTMCNC: 34.1 G/DL (ref 33–37)
MCV RBC AUTO: 92.2 FL (ref 80–94)
MONOCYTES # BLD: 0.5 K/UL (ref 0–0.8)
MONOCYTES NFR BLD: 11.4 % (ref 0–10)
NEUTROPHILS # BLD: 2.6 K/UL (ref 1.8–7)
NEUTROPHILS NFR BLD AUTO: 61.5 % (ref 50–75)
NRBC BLD AUTO-RTO: 0 % (ref 0–0)
PCO2 BLDV: 45 MMHG (ref 40–60)
PCO2 BLDV: 47 MMHG (ref 40–60)
PH BLDV: 7.36 [PH] (ref 7.32–7.43)
PH BLDV: 7.42 [PH] (ref 7.32–7.43)
PLATELET # BLD: 217 K/UL (ref 130–400)
PMV BLD AUTO: 8.1 FL (ref 7.2–11.7)
RBC # BLD AUTO: 4.59 MIL/UL (ref 4.4–5.9)
VENOUS BLOOD FIO2: 21 %
VENOUS BLOOD FIO2: 28 %
VENOUS BLOOD GAS PO2: 16 MM/HG (ref 30–55)
VENOUS BLOOD GAS PO2: 23 MM/HG (ref 30–55)
WBC # BLD AUTO: 4.2 K/UL (ref 4.8–10.8)

## 2018-10-09 NOTE — ED PDOC
HPI: Influenza


Time Seen by Provider: 10/09/18 17:34


Chief Complaint: Cough, Cold, Congestion


Chief Complaint (Provider): Cough, Cold, Congestion


History Per: Patient


Exam Limitations: no limitations


Have you had recent travel within the past 21 days to any of: No


Onset/Duration Of Symptoms: Days (x7 days ago )


Symptoms include: fever (tactile), cough (productive cough with yellow sputum ).

 denies: vomiting, diarrhea, chest pain, rash


Additional complaint(s):: 





Juan Perry is a 48 year old male with a past medical history of DVT, p

ulmonary embolism, leg pain, and HTN, who presents to the emergency department 

complaining of a tactile fever with chills and productive cough with yellow 

sputum, onset x7 days ago. Patient was seen in ED yesterday and had a chest x-

ray taken. He was given an albuterol pump, but symptoms worsened yesterday so he

came to the ED today. He reports of having a decreased appetite and denies 

having chest pain, hemoptysis, rash, abdominal pain, nausea, vomiting, diarrhea,

sick contact, or travel. 





PMD: Kassandra Roque





Past Medical History


Reviewed: Historical Data, Nursing Documentation, Vital Signs


Vital Signs: 


                                Last Vital Signs











Temp  99.3 F   10/09/18 18:30


 


Pulse  86   10/09/18 18:22


 


Resp  20   10/09/18 18:22


 


BP  98/60 L  10/09/18 18:22


 


Pulse Ox  98   10/09/18 18:22














- Medical History


PMH: Bronchitis, Depression, HTN, Hypercholesterolemia, Kidney Stones, Chronic 

Pain (neck and back)


   Denies: Diabetes, Hepatitis, HIV, Chronic Kidney Disease, Seizures, Sexually 

Transmitted Disease





- Surgical History


Surgical History: No Surg Hx





- Family History


Family History: States: Hypertension (mother, father)





- Immunization History


Hx Tetanus Toxoid Vaccination: Yes





- Home Medications


Home Medications: 


                                Ambulatory Orders











 Medication  Instructions  Recorded


 


RX: traMADol [Ultram] 50 mg PO Q8 PRN #12 tab 02/01/17


 


Cyclobenzaprine [Cyclobenzaprine 10 mg PO BID PRN #10 tab 03/16/17





HCl]  


 


Acetaminophen with Codeine 1 tab PO Q6 PRN #10 tab 02/12/18





[Tylenol with Codeine No. 3 300  





mg-30 mg]  


 


Alprazolam [Xanax] 0.5 mg PO DAILY 03/12/18


 


Ciprofloxacin/Dexamethasone 1 - 2 drop OD BID PRN 03/12/18





[Ciprodex Otic]  


 


Hydrochlorothiazide [Microzide] 12.5 mg PO DAILY 03/12/18


 


RX: Losartan Potassium 50 mg PO DAILY 03/12/18


 


Benzonatate [Tessalon Perle] 100 mg PO Q8H PRN #21 capsule 10/08/18


 


RX: Albuterol HFA [Ventolin HFA 90 2 puff IH Q6H PRN #60 puff 10/08/18





mcg/actuation (8 g)]  














- Allergies


Allergies/Adverse Reactions: 


                                    Allergies











Allergy/AdvReac Type Severity Reaction Status Date / Time


 


No Known Allergies Allergy   Verified 10/09/18 17:27














Review of Systems


ROS Statement: Except As Marked, All Systems Reviewed And Found Negative


Constitutional: Positive for: Fever (tactile ), Chills


Cardiovascular: Negative for: Chest Pain


Respiratory: Positive for: Cough, Sputum (yellow ).  Negative for: Hemoptysis


Gastrointestinal: Positive for: Other (decreased appetite ).  Negative for: 

Nausea, Vomiting, Abdominal Pain, Diarrhea


Skin: Negative for: Rash





Physical Exam





- Reviewed


Nursing Documentation Reviewed: Yes


Vital Signs Reviewed: Yes





- Physical Exam


Appears: Positive for: Well, No Acute Distress


Head Exam: Positive for: ATRAUMATIC, NORMOCEPHALIC


Skin: Positive for: Normal Color, Warm, Dry


Eye Exam: Positive for: Normal appearance, EOMI, PERRL


ENT: Positive for: Normal ENT Inspection


Neck: Positive for: Normal, Painless ROM, Supple


Cardiovascular/Chest: Positive for: Regular Rate, Rhythm


Respiratory: Positive for: Wheezing (bilateral expiratory wheezing).  Negative 

for: Respiratory Distress ((+) able to speak in full sentences)


Gastrointestinal/Abdominal: Positive for: Normal Exam, Bowel Sounds, Soft.  

Negative for: Tenderness


Back: Positive for: Normal Inspection.  Negative for: L CVA Tenderness, R CVA Te

nderness, Vertebral Tenderness


Extremity: Positive for: Normal ROM.  Negative for: Tenderness, Deformity





Medical Decision Making


Medical Decision Making: 





Initial Time: 17:43


Initial Plan: 


--VBG shock panel 2.3 


--EKG


--CMP


--Troponin I 


--Chest Portable [RAD]


--Duoneb 3 mg/0.5 gm (3 ml) UD


--Solu-mendrol 125 mg IV once 


-- Sodium Chloride 1000 ml IV


--Zofran 4 mg IVP


--Blood culture


--Cardiac monitor 


--Saline lock





As the RN was attempting to place IV, patient began to feel dizzy and turned 

diaphoretic and felt like he was gonna pass out. Patient did not have any loss 

of consciousness. Repeat BP: 98/60, temp: 99.3. 


Dr. James recommends adding troponin and placing patient on cardiac monitor. 


--VBG shock panel 2.3 


--Sodium chloride 1000 ml IV 





Repeat VBG remains 2.3. Case d/w Dr. Calvillo who recommends Levaquin IV and 23 

hour observation


Case d/w Dr. Hameed, [facility] on call, and arrangements made for 23 hour telemetry

 observation. Pt. informed of plan. States dizziness has improved but still 

present. Repeat neuro exam remains non-focal. Antivert 50mg PO, CT head w/o 

contrast ordered.


CT: no ICH; b/l maxillary sinusitis. 





 

--------------------------------------------------------------------------------


--------------------------------------   


Scribe Attestation:


Documented by Lars Otoole, acting as a scribe for García CHOI





Provider Scribe Attestation:


All medical record entries made by the Scribe were at my direction and 

personally dictated by me. I have reviewed the chart and agree that the record 

accurately reflects my personal performance of the history, physical exam, 

medical decision making, and the department course for this patient. I have also

 personally directed, reviewed, and agree with the discharge instructions and 

disposition.





- Laboratory Results


Result Diagrams: 


                                 10/09/18 18:43





                                 10/09/18 18:58





- ECG


O2 Sat by Pulse Oximetry: 98





- Progress


ED Course And Treament: 





As RN was attempting to place IV, patient began to feel dizzy and turned 

diaphoretic and felt like he was gonna pass out. Patient did not lose 

consciousness. 





Dr. James recommends adding troponin and placin patient on cardiac monitor. 





Provider ordered an additional bolus. 





Disposition





- Clinical Impression


Clinical Impression: 


 Pre-syncope, Bronchitis








- Patient ED Disposition


Is Patient to be Admitted: Yes





- Disposition


Disposition Time: 19:00


Condition: FAIR

## 2018-10-10 LAB
ALBUMIN SERPL-MCNC: 4.4 G/DL (ref 3.5–5)
ALBUMIN/GLOB SERPL: 1.3 {RATIO} (ref 1–2.1)
ALT SERPL-CCNC: 48 U/L (ref 21–72)
AST SERPL-CCNC: 56 U/L (ref 17–59)
BUN SERPL-MCNC: 14 MG/DL (ref 9–20)
CALCIUM SERPL-MCNC: 9.4 MG/DL (ref 8.4–10.2)
ERYTHROCYTE [DISTWIDTH] IN BLOOD BY AUTOMATED COUNT: 13.4 % (ref 11.5–14.5)
GFR NON-AFRICAN AMERICAN: > 60
HDLC SERPL-MCNC: 35 MG/DL (ref 30–70)
HGB BLD-MCNC: 13.8 G/DL (ref 12–18)
LDLC SERPL-MCNC: 81 MG/DL (ref 0–129)
MCH RBC QN AUTO: 31.5 PG (ref 27–31)
MCHC RBC AUTO-ENTMCNC: 34 G/DL (ref 33–37)
MCV RBC AUTO: 92.5 FL (ref 80–94)
PLATELET # BLD: 234 K/UL (ref 130–400)
RBC # BLD AUTO: 4.38 MIL/UL (ref 4.4–5.9)
WBC # BLD AUTO: 2.4 K/UL (ref 4.8–10.8)

## 2018-10-10 PROCEDURE — 3E0234Z INTRODUCTION OF SERUM, TOXOID AND VACCINE INTO MUSCLE, PERCUTANEOUS APPROACH: ICD-10-PCS | Performed by: FAMILY MEDICINE

## 2018-10-10 RX ADMIN — IPRATROPIUM BROMIDE AND ALBUTEROL SULFATE SCH ML: .5; 3 SOLUTION RESPIRATORY (INHALATION) at 19:14

## 2018-10-10 RX ADMIN — IPRATROPIUM BROMIDE AND ALBUTEROL SULFATE PRN ML: .5; 3 SOLUTION RESPIRATORY (INHALATION) at 10:11

## 2018-10-10 RX ADMIN — IPRATROPIUM BROMIDE AND ALBUTEROL SULFATE SCH ML: .5; 3 SOLUTION RESPIRATORY (INHALATION) at 13:02

## 2018-10-10 RX ADMIN — METHYLPREDNISOLONE SODIUM SUCCINATE SCH MG: 40 INJECTION, POWDER, FOR SOLUTION INTRAMUSCULAR; INTRAVENOUS at 21:05

## 2018-10-10 RX ADMIN — FLUTICASONE PROPIONATE AND SALMETEROL SCH PUFF: 50; 250 POWDER RESPIRATORY (INHALATION) at 21:05

## 2018-10-10 RX ADMIN — PANTOPRAZOLE SODIUM SCH MG: 40 TABLET, DELAYED RELEASE ORAL at 12:05

## 2018-10-10 RX ADMIN — FLUTICASONE PROPIONATE AND SALMETEROL SCH PUFF: 50; 250 POWDER RESPIRATORY (INHALATION) at 12:02

## 2018-10-10 RX ADMIN — METHYLPREDNISOLONE SODIUM SUCCINATE SCH MG: 40 INJECTION, POWDER, FOR SOLUTION INTRAMUSCULAR; INTRAVENOUS at 12:03

## 2018-10-10 NOTE — CARD
--------------- APPROVED REPORT --------------





Date of service: 10/09/2018



EKG Measurement

Heart [id number]

HI [id number]

[id number]

[id number]

[id number]



<Conclusion>

Normal sinus rhythm

Normal ECG
--------------- APPROVED REPORT --------------





Date of service: 10/10/2018



EKG Measurement

Heart [id number]

NC [id number]

[id number]

[id number]

[id number]



<Conclusion>

Normal sinus rhythm

Normal ECG
Detail Level: Detailed

## 2018-10-10 NOTE — CP.PCM.HP
History of Present Illness





- History of Present Illness


History of Present Illness: 





48 year old male with pmhx of HTN, HLD, Depression, and anxiety presented to ED 

with complaints of productive cough, subjective fever and chills x 1 week. 

Patient reports to intermitted cough x 3 weeks. In the ED, patient felt dizzy an

d turned diaphoretic but did not lose consciousness.  Patient was admitted 

yesterday for acute bronchitis and presyncopal episodes. Denies any chest pain, 

dyspnea, headache, nausea, vomiting, abdominal pain or  complaints. Denies any

drugs uses.





ROS: all 12 systems reviewed and negative except as mentioned in HPI





PMHx: HTN, HLD, Depression, Anxiety





Surgical Hx: Ankle Surgery in 1998





Social Hx: Denies smoking cigarettes, drinking alcohol or using drugs.





Family History : Mother: Asthma  Father: Multiple Stroke





Allergies: NKDA





Medications: reviewed





ED Course: EKG NSR, head CT, CXR : neg, NS X 3, duoneb, solumdrol IM and IV 

levaquin





Present on Admission





- Present on Admission


Any Indicators Present on Admission: No





Review of Systems





- Review of Systems


All systems: reviewed and no additional remarkable complaints except





Past Patient History





- Infectious Disease


Hx of Infectious Diseases: None





- Past Medical History & Family History


Past Medical History?: Yes





- Past Social History


Smoking Status: Former Smoker





- CARDIAC


Hx Hypercholesterolemia: Yes


Hx Hypertension: Yes





- PULMONARY


Hx Bronchitis: Yes


Hx Pulmonary Embolism: Yes


Hx Respiratory Tract Infection: Yes





- NEUROLOGICAL


Hx Seizures: No





- HEENT


Hx HEENT Problems: Yes


Hx Deafness: Yes





- RENAL


Hx Chronic Kidney Disease: No


Hx Kidney Stones: Yes





- ENDOCRINE/METABOLIC


Hx Endocrine Disorders: No





- HEMATOLOGICAL/ONCOLOGICAL


Hx Human Immunodeficiency Virus (HIV): No





- INTEGUMENTARY


Hx Dermatological Problems: No





- MUSCULOSKELETAL/RHEUMATOLOGICAL


Hx Musculoskeletal Disorders: Yes


Hx Back Pain: Yes


Hx Falls: No





- GASTROINTESTINAL


Hx Gastrointestinal Disorders: No





- GENITOURINARY/GYNECOLOGICAL


Hx Sexually Transmitted Disorders: No





- PSYCHIATRIC


Hx Depression: Yes


Hx Substance Use: Yes





- SURGICAL HISTORY


Hx Surgeries: No


Other/Comment: right ankle





- ANESTHESIA


Hx Anesthesia: Yes


Hx Anesthesia Reactions: No


Hx Malignant Hyperthermia: No





Meds


Allergies/Adverse Reactions: 


                                    Allergies











Allergy/AdvReac Type Severity Reaction Status Date / Time


 


No Known Allergies Allergy   Verified 10/09/18 17:27














Physical Exam





- Constitutional


Appears: Non-toxic, No Acute Distress





- Head Exam


Head Exam: NORMAL INSPECTION, NORMOCEPHALIC





- Eye Exam


Eye Exam: EOMI, Normal appearance





- ENT Exam


ENT Exam: Mucous Membranes Moist





- Neck Exam


Neck exam: Positive for: Normal Inspection.  Negative for: Meningismus





- Respiratory Exam


Respiratory Exam: Decreased Breath Sounds, NORMAL BREATHING PATTERN.  absent: 

Accessory Muscle Use, Respiratory Distress


Additional comments: 





scattered expiratory wheezing





- Cardiovascular Exam


Cardiovascular Exam: REGULAR RHYTHM, RRR, +S1, +S2





- GI/Abdominal Exam


GI & Abdominal Exam: Normal Bowel Sounds, Soft.  absent: Tenderness





- Extremities Exam


Extremities exam: Positive for: normal inspection.  Negative for: calf 

tenderness





- Back Exam


Back exam: absent: CVA tenderness (L), CVA tenderness (R)





- Neurological Exam


Neurological exam: Alert, CN II-XII Intact, Oriented x3





- Psychiatric Exam


Psychiatric exam: Normal Affect, Normal Mood





- Skin


Skin Exam: Normal Color, Warm





Results





- Vital Signs


Recent Vital Signs: 





                                Last Vital Signs











Temp  97.9 F   10/10/18 09:41


 


Pulse  86   10/10/18 10:13


 


Resp  20   10/10/18 09:41


 


BP  135/82   10/10/18 09:59


 


Pulse Ox  98   10/10/18 09:41














- Labs


Result Diagrams: 


                                 10/10/18 04:40





                                 10/10/18 04:40


Labs: 





                         Laboratory Results - last 24 hr











  10/09/18 10/09/18 10/09/18





  18:26 18:43 18:43


 


WBC   4.2 L 


 


RBC   4.59 


 


Hgb   14.4 


 


Hct   42.3 


 


MCV   92.2 


 


MCH   31.4 H 


 


MCHC   34.1 


 


RDW   13.6 


 


Plt Count   217 


 


MPV   8.1 


 


Neut % (Auto)   61.5 


 


Lymph % (Auto)   25.0 


 


Mono % (Auto)   11.4 H 


 


Eos % (Auto)   1.6 


 


Baso % (Auto)   0.5 


 


Neut # (Auto)   2.6 


 


Lymph # (Auto)   1.0 


 


Mono # (Auto)   0.5 


 


Eos # (Auto)   0.1 


 


Baso # (Auto)   0.0 


 


pO2    16 L


 


VBG pH    7.42


 


VBG pCO2    45


 


VBG HCO3    26.0


 


VBG Total CO2    30.6 H


 


VBG O2 Sat (Calc)    31.7 L


 


VBG Base Excess    4.0 H


 


VBG Potassium    4.1


 


Sodium    132.0


 


Chloride    98.0


 


Glucose    125 H


 


Lactate    2.3 H


 


FiO2    21.0


 


Potassium   


 


Carbon Dioxide   


 


Anion Gap   


 


BUN   


 


Creatinine   


 


Est GFR ( Amer)   


 


Est GFR (Non-Af Amer)   


 


POC Glucose (mg/dL)  122 H  


 


Random Glucose   


 


Lactic Acid   


 


Calcium   


 


Total Bilirubin   


 


AST   


 


ALT   


 


Alkaline Phosphatase   


 


Troponin I   


 


Total Protein   


 


Albumin   


 


Globulin   


 


Albumin/Globulin Ratio   


 


Triglycerides   


 


Cholesterol   


 


LDL Cholesterol Direct   


 


HDL Cholesterol   


 


Venous Blood Potassium    4.1


 


Urine Opiates Screen   


 


Urine Methadone Screen   


 


Ur Barbiturates Screen   


 


Ur Phencyclidine Scrn   


 


Ur Amphetamines Screen   


 


U Benzodiazepines Scrn   


 


U Oth Cocaine Metabols   


 


U Cannabinoids Screen   














  10/09/18 10/09/18 10/09/18





  18:58 18:58 20:19


 


WBC   


 


RBC   


 


Hgb   


 


Hct   


 


MCV   


 


MCH   


 


MCHC   


 


RDW   


 


Plt Count   


 


MPV   


 


Neut % (Auto)   


 


Lymph % (Auto)   


 


Mono % (Auto)   


 


Eos % (Auto)   


 


Baso % (Auto)   


 


Neut # (Auto)   


 


Lymph # (Auto)   


 


Mono # (Auto)   


 


Eos # (Auto)   


 


Baso # (Auto)   


 


pO2    23 L


 


VBG pH    7.36


 


VBG pCO2    47


 


VBG HCO3    23.8


 


VBG Total CO2    28.0


 


VBG O2 Sat (Calc)    43.3


 


VBG Base Excess    0.6


 


VBG Potassium    4.0


 


Sodium  137   135.0


 


Chloride  97 L   100.0


 


Glucose    187 H


 


Lactate    2.3 H


 


FiO2    28.0


 


Potassium  4.3  


 


Carbon Dioxide  27  


 


Anion Gap  17  


 


BUN  17  


 


Creatinine  1.0  


 


Est GFR ( Amer)  > 60  


 


Est GFR (Non-Af Amer)  > 60  


 


POC Glucose (mg/dL)   


 


Random Glucose  117 H  


 


Lactic Acid   


 


Calcium  9.3  


 


Total Bilirubin  0.4  


 


AST  55  


 


ALT  45  


 


Alkaline Phosphatase  77  


 


Troponin I   < 0.0120 


 


Total Protein  7.9  


 


Albumin  4.5  


 


Globulin  3.4  


 


Albumin/Globulin Ratio  1.3  


 


Triglycerides   


 


Cholesterol   


 


LDL Cholesterol Direct   


 


HDL Cholesterol   


 


Venous Blood Potassium    4.0


 


Urine Opiates Screen   


 


Urine Methadone Screen   


 


Ur Barbiturates Screen   


 


Ur Phencyclidine Scrn   


 


Ur Amphetamines Screen   


 


U Benzodiazepines Scrn   


 


U Oth Cocaine Metabols   


 


U Cannabinoids Screen   














  10/10/18 10/10/18 10/10/18





  04:40 04:40 04:40


 


WBC  2.4 L  


 


RBC  4.38 L  


 


Hgb  13.8  


 


Hct  40.5  


 


MCV  92.5  


 


MCH  31.5 H  


 


MCHC  34.0  


 


RDW  13.4  


 


Plt Count  234  


 


MPV   


 


Neut % (Auto)   


 


Lymph % (Auto)   


 


Mono % (Auto)   


 


Eos % (Auto)   


 


Baso % (Auto)   


 


Neut # (Auto)   


 


Lymph # (Auto)   


 


Mono # (Auto)   


 


Eos # (Auto)   


 


Baso # (Auto)   


 


pO2   


 


VBG pH   


 


VBG pCO2   


 


VBG HCO3   


 


VBG Total CO2   


 


VBG O2 Sat (Calc)   


 


VBG Base Excess   


 


VBG Potassium   


 


Sodium   139 


 


Chloride   103 


 


Glucose   


 


Lactate   


 


FiO2   


 


Potassium   4.6 


 


Carbon Dioxide   24 


 


Anion Gap   17 


 


BUN   14 


 


Creatinine   0.9 


 


Est GFR ( Amer)   > 60 


 


Est GFR (Non-Af Amer)   > 60 


 


POC Glucose (mg/dL)   


 


Random Glucose   206 H 


 


Lactic Acid    2.4 H


 


Calcium   9.4 


 


Total Bilirubin   0.2 


 


AST   56 


 


ALT   48 


 


Alkaline Phosphatase   79 


 


Troponin I   < 0.0120 


 


Total Protein   7.9 


 


Albumin   4.4 


 


Globulin   3.5 


 


Albumin/Globulin Ratio   1.3 


 


Triglycerides   


 


Cholesterol   


 


LDL Cholesterol Direct   


 


HDL Cholesterol   


 


Venous Blood Potassium   


 


Urine Opiates Screen   


 


Urine Methadone Screen   


 


Ur Barbiturates Screen   


 


Ur Phencyclidine Scrn   


 


Ur Amphetamines Screen   


 


U Benzodiazepines Scrn   


 


U Oth Cocaine Metabols   


 


U Cannabinoids Screen   














  10/10/18 10/10/18





  10:41 11:21


 


WBC  


 


RBC  


 


Hgb  


 


Hct  


 


MCV  


 


MCH  


 


MCHC  


 


RDW  


 


Plt Count  


 


MPV  


 


Neut % (Auto)  


 


Lymph % (Auto)  


 


Mono % (Auto)  


 


Eos % (Auto)  


 


Baso % (Auto)  


 


Neut # (Auto)  


 


Lymph # (Auto)  


 


Mono # (Auto)  


 


Eos # (Auto)  


 


Baso # (Auto)  


 


pO2  


 


VBG pH  


 


VBG pCO2  


 


VBG HCO3  


 


VBG Total CO2  


 


VBG O2 Sat (Calc)  


 


VBG Base Excess  


 


VBG Potassium  


 


Sodium  


 


Chloride  


 


Glucose  


 


Lactate  


 


FiO2  


 


Potassium  


 


Carbon Dioxide  


 


Anion Gap  


 


BUN  


 


Creatinine  


 


Est GFR ( Amer)  


 


Est GFR (Non-Af Amer)  


 


POC Glucose (mg/dL)  


 


Random Glucose  


 


Lactic Acid  


 


Calcium  


 


Total Bilirubin  


 


AST  


 


ALT  


 


Alkaline Phosphatase  


 


Troponin I   < 0.0120


 


Total Protein  


 


Albumin  


 


Globulin  


 


Albumin/Globulin Ratio  


 


Triglycerides   56


 


Cholesterol   135


 


LDL Cholesterol Direct   81


 


HDL Cholesterol   35


 


Venous Blood Potassium  


 


Urine Opiates Screen  Negative 


 


Urine Methadone Screen  Negative 


 


Ur Barbiturates Screen  Negative 


 


Ur Phencyclidine Scrn  Negative 


 


Ur Amphetamines Screen  Negative 


 


U Benzodiazepines Scrn  Negative 


 


U Oth Cocaine Metabols  Negative 


 


U Cannabinoids Screen  Negative 














Assessment & Plan





- Assessment and Plan (Free Text)


Assessment: 





48 year old male with pmhx of HTN, HLD, Depression, anxiety presented to ED with

complaints of productive cough, subjective fever and chills x 1 week. Patient is

admitted for presycope and acute bronchitis.





Plan:


Admit to tele


Duoneb


IV steroids


c/w levaquin


Repeat EKG


[facility] LABS, troponin x3





Patient seen, examined and plan d/w Dr. Rikki Bradshaw, pgy-2

## 2018-10-10 NOTE — RAD
Date of service: 



10/09/2018



HISTORY:

Cough



COMPARISON:

10/08/2018. 



FINDINGS:



LUNGS:

The lungs are well inflated and clear.



PLEURA:

No significant pleural effusion identified, no pneumothorax apparent.



CARDIOVASCULAR:

Normal.



OSSEOUS STRUCTURES:

No significant abnormalities.



VISUALIZED UPPER ABDOMEN:

Normal.



OTHER FINDINGS:

None.



IMPRESSION:

No active pulmonary disease.

## 2018-10-10 NOTE — CT
Date of service: 



10/09/2018



PROCEDURE:  CT HEAD WITHOUT CONTRAST.



HISTORY:

dizziness



COMPARISON:

4/30/2017



TECHNIQUE:

Axial computed tomography images were obtained through the head/brain 

without intravenous contrast.  



Radiation dose:



Total exam DLP = 821 mGy-cm.



This CT exam was performed using one or more of the following dose 

reduction techniques: Automated exposure control, adjustment of the 

mA and/or kV according to patient size, and/or use of iterative 

reconstruction technique.



FINDINGS:



HEMORRHAGE:

No intracranial hemorrhage. 



BRAIN:

No mass effect or edema.  No atrophy or chronic microvascular 

ischemic changes.



VENTRICLES:

Unremarkable. No hydrocephalus. 



CALVARIUM:

Unremarkable.



PARANASAL SINUSES:

Ethmoid sphenoid sinus and maxillary sinus inflammatory changes are 

present.  Inflammatory changes are most pronounced in the right 

maxillary sinus.



MASTOID AIR CELLS:

Unremarkable as visualized. No inflammatory changes.



OTHER FINDINGS:

None.



IMPRESSION:

No intracranial hemorrhage or mass effect.



Paranasal sinus inflammatory changes-grossly similar-appearing. 



Concordant results (preliminary interpretation) provided by [facility].

## 2018-10-11 VITALS
TEMPERATURE: 97.4 F | SYSTOLIC BLOOD PRESSURE: 139 MMHG | DIASTOLIC BLOOD PRESSURE: 79 MMHG | HEART RATE: 76 BPM | RESPIRATION RATE: 20 BRPM | OXYGEN SATURATION: 96 %

## 2018-10-11 LAB
BUN SERPL-MCNC: 16 MG/DL (ref 9–20)
CALCIUM SERPL-MCNC: 9.2 MG/DL (ref 8.4–10.2)
ERYTHROCYTE [DISTWIDTH] IN BLOOD BY AUTOMATED COUNT: 13.5 % (ref 11.5–14.5)
GFR NON-AFRICAN AMERICAN: > 60
HGB BLD-MCNC: 13.3 G/DL (ref 12–18)
MCH RBC QN AUTO: 31.3 PG (ref 27–31)
MCHC RBC AUTO-ENTMCNC: 33.9 G/DL (ref 33–37)
MCV RBC AUTO: 92.2 FL (ref 80–94)
PLATELET # BLD: 234 K/UL (ref 130–400)
RBC # BLD AUTO: 4.27 MIL/UL (ref 4.4–5.9)
WBC # BLD AUTO: 6.2 K/UL (ref 4.8–10.8)

## 2018-10-11 RX ADMIN — METHYLPREDNISOLONE SODIUM SUCCINATE SCH MG: 40 INJECTION, POWDER, FOR SOLUTION INTRAMUSCULAR; INTRAVENOUS at 12:02

## 2018-10-11 RX ADMIN — IPRATROPIUM BROMIDE AND ALBUTEROL SULFATE SCH ML: .5; 3 SOLUTION RESPIRATORY (INHALATION) at 02:14

## 2018-10-11 RX ADMIN — IPRATROPIUM BROMIDE AND ALBUTEROL SULFATE PRN ML: .5; 3 SOLUTION RESPIRATORY (INHALATION) at 06:00

## 2018-10-11 RX ADMIN — IPRATROPIUM BROMIDE AND ALBUTEROL SULFATE SCH ML: .5; 3 SOLUTION RESPIRATORY (INHALATION) at 08:14

## 2018-10-11 RX ADMIN — FLUTICASONE PROPIONATE AND SALMETEROL SCH PUFF: 50; 250 POWDER RESPIRATORY (INHALATION) at 09:12

## 2018-10-11 RX ADMIN — PANTOPRAZOLE SODIUM SCH MG: 40 TABLET, DELAYED RELEASE ORAL at 09:15

## 2018-10-11 NOTE — CP.PCM.PN
Subjective





- Date & Time of Evaluation


Date of Evaluation: 10/11/18


Time of Evaluation: 09:40





- Subjective


Subjective: 





Patient seen and examined this morning with Dr. Brandt. Patient reports he still

feels shortness of breath and has cough. No acute overnight events. Denies 

fever, chills, chest pain, dyspnea, nausea,vomiting or dizziness. 





Objective





- Vital Signs/Intake and Output


Vital Signs (last 24 hours): 


                                        











Temp Pulse Resp BP Pulse Ox


 


 97.4 F L  76   20   139/79   96 


 


 10/11/18 13:03  10/11/18 13:03  10/11/18 13:03  10/11/18 13:03  10/11/18 13:03








Intake and Output: 


                                        











 10/11/18 10/11/18





 06:59 18:59


 


Intake Total 960 


 


Output Total 450 


 


Balance 510 














- Medications


Medications: 


                               Current Medications





Acetaminophen (Tylenol 325mg Tab)  650 mg PO Q6 PRN


   PRN Reason: Pain, Mild (1-3)


   Last Admin: 10/10/18 10:01 Dose:  650 mg


Acetylcysteine (Mucomyst 10% 4ml)  3 ml IH RTID LUDWIG


Albuterol/Ipratropium (Duoneb 3 Mg/0.5 Mg (3 Ml) Ud)  3 ml INH RQ4 LUDWIG


Albuterol/Ipratropium (Duoneb 3 Mg/0.5 Mg (3 Ml) Ud)  3 ml INH RQ6 PRN


   PRN Reason: Shortness of Breath


Atorvastatin Calcium (Lipitor)  40 mg PO DAILY [facility]


   Last Admin: 10/11/18 09:15 Dose:  40 mg


Benzonatate (Tessalon Perles)  100 mg PO Q8H PRN


   PRN Reason: Cough


Cyclobenzaprine HCl (Flexeril)  10 mg PO BID PRN


   PRN Reason: Muscle spasm


Escitalopram Oxalate (Lexapro)  10 mg PO BID [facility]


   Last Admin: 10/11/18 09:15 Dose:  10 mg


Fluticasone Propionate (Flonase)  1 spr DASHA BID [facility]


   Last Admin: 10/11/18 09:13 Dose:  1 spr


Guaifenesin/Dextromethorphan (Mucinex-Dm 600-30 Mg)  1 tab PO Q12 [facility]


   Last Admin: 10/11/18 12:01 Dose:  1 tab


Hydrochlorothiazide (Microzide)  12.5 mg PO DAILY [facility]


   Last Admin: 10/11/18 09:15 Dose:  12.5 mg


Levofloxacin/Dextrose (Levaquin 750mg)  750 mg in 150 mls @ 100 mls/hr IVPB 

DAILY [facility]


   Last Admin: 10/11/18 09:24 Dose:  100 mls/hr


Losartan Potassium (Cozaar)  50 mg PO DAILY [facility]


   Last Admin: 10/11/18 09:13 Dose:  50 mg


Methylprednisolone (Solu-Medrol)  40 mg IVP Q12 [facility]


   Last Admin: 10/11/18 12:02 Dose:  40 mg


Pantoprazole Sodium (Protonix Ec Tab)  40 mg PO DAILY [facility]


   Last Admin: 10/11/18 09:15 Dose:  40 mg


Fluticasone/Salmeterol (Advair Diskus 250/50)  1 puff IH Q12 [facility]


   Last Admin: 10/11/18 09:12 Dose:  1 puff











- Labs


Labs: 


                                        





                                 10/11/18 05:19 





                                 10/11/18 05:19 











- Constitutional


Appears: Non-toxic, No Acute Distress





- Head Exam


Head Exam: NORMAL INSPECTION





- Eye Exam


Eye Exam: Normal appearance





- ENT Exam


ENT Exam: Mucous Membranes Moist





- Respiratory Exam


Respiratory Exam: Wheezes (diffuse end expiratory wheezing with rhonchi on right

lung base. ).  absent: Accessory Muscle Use, Respiratory Distress





- Cardiovascular Exam


Cardiovascular Exam: REGULAR RHYTHM, +S1, +S2





- GI/Abdominal Exam


GI & Abdominal Exam: Soft, Normal Bowel Sounds.  absent: Tenderness





- Extremities Exam


Extremities Exam: absent: Calf Tenderness





- Neurological Exam


Neurological Exam: Alert, Awake, Oriented x3





- Psychiatric Exam


Psychiatric exam: Normal Affect, Normal Mood





- Skin


Skin Exam: Normal Color, Warm





Assessment and Plan





- Assessment and Plan (Free Text)


Assessment: 





48 year old male with pmhx of HTN, HLD, Depression, anxiety presented to ED with

complaints of productive cough, subjective fever and chills x 1 week. Patient is

admitted for presycope and acute bronchitis.





Plan:





c/w Duoneb q4hr


c/w IV methylprednisolone 40 mg q12hr


c/w levaquin


c/w home medications


f/u AM LABS





Patient seen, examined and plan d/w Dr. Rikki Bradshaw, pgy-2

## 2018-11-08 ENCOUNTER — HOSPITAL ENCOUNTER (EMERGENCY)
Dept: HOSPITAL 14 - H.ER | Age: 48
Discharge: HOME | End: 2018-11-08
Payer: COMMERCIAL

## 2018-11-08 VITALS
TEMPERATURE: 98 F | SYSTOLIC BLOOD PRESSURE: 177 MMHG | DIASTOLIC BLOOD PRESSURE: 95 MMHG | RESPIRATION RATE: 18 BRPM | OXYGEN SATURATION: 98 % | HEART RATE: 93 BPM

## 2018-11-08 VITALS — BODY MASS INDEX: 38.9 KG/M2

## 2018-11-08 DIAGNOSIS — J45.909: Primary | ICD-10-CM

## 2018-11-08 DIAGNOSIS — J06.9: ICD-10-CM

## 2018-11-08 NOTE — ED PDOC
HPI: SOB/CHF/COPD


Time Seen by Provider: 11/08/18 21:34


Chief Complaint (Nursing): Shortness Of Breath


History Per: Patient, Other (wife)


History/Exam Limitations: no limitations


Onset/Duration Of Symptoms: Days


Current Symptoms Are (Timing): Still Present


Initiating Event: Upper Respiratory Illness


Additional Complaint(s): 





47 yo M presents with 2 days of worsening asthma exacerbation.  Pt states he has

had a chest and head cold and felt that for the last two days he has worsening 

wheezing. PT states nebulizers help him but he has not been able to get a 

machine.  PT states he has been using his albuterol pump but it isn't helping.





No previous intubations, no sick contacts, no recent hospitalizations.  


 





Past Medical History


Vital Signs: 





                                Last Vital Signs











Temp  98.0 F   11/08/18 21:04


 


Pulse  93 H  11/08/18 21:04


 


Resp  18   11/08/18 21:04


 


BP  177/95 H  11/08/18 21:04


 


Pulse Ox  98   11/08/18 21:04














- Medical History


PMH: Bronchitis, Depression, HTN, Hypercholesterolemia, Kidney Stones, Pulmonary

Embolism, Chronic Pain (neck and back)


   Denies: Diabetes, Hepatitis, HIV, Chronic Kidney Disease, Seizures, Sexually 

Transmitted Disease





- Family History


Family History: States: Unknown Family Hx, Hypertension (mother, father)





- Immunization History


Hx Tetanus Toxoid Vaccination: Yes





- Home Medications


Home Medications: 


                                Ambulatory Orders











 Medication  Instructions  Recorded


 


Cyclobenzaprine [Flexeril] 10 mg PO BID PRN #10 tab 03/16/17


 


Acetaminophen with Codeine 1 tab PO Q6 PRN #10 tab 02/12/18





[Tylenol with Codeine #3 Tablet]  


 


Alprazolam [Xanax] 0.5 mg PO DAILY 03/12/18


 


Ciprofloxacin/Dexamethasone 1 - 2 drop OD BID PRN 03/12/18





[Ciprodex Otic]  


 


Hydrochlorothiazide [Microzide] 12.5 mg PO DAILY 03/12/18


 


Losartan Potassium 50 mg PO DAILY 03/12/18


 


Albuterol HFA [Ventolin HFA 90 2 puff IH Q6H PRN #60 puff 10/08/18





mcg/actuation (8 g)]  


 


Atorvastatin [Lipitor] 40 mg PO 10/10/18


 


Benzonatate [Tessalon Perle] 100 mg PO Q8H PRN #21 capsule 10/11/18


 


Escitalopram [Lexapro] 10 mg PO BID #60 tab 10/11/18


 


Fluticasone Propionate [Flonase] 1 spr DASHA BID #1 bottle 10/11/18


 


Fluticasone/Salmeterol 250/50 1 puff IH Q12 #60 puff 10/11/18





[Advair Diskus 250/50]  


 


Levofloxacin [Levaquin] 500 mg PO DAILY #5 tablet 10/11/18


 


Methylprednisolone [Medrol Dose 4 mg PO DAILY #21 mg 10/11/18





Pack (21 tabs)]  


 


Pantoprazole [Protonix EC Tab] 40 mg PO DAILY #30 ect 10/11/18


 


guaiFENesin/Dextromethorphan 1 tab PO Q12 #14 tab 10/11/18





[Mucinex--30 mg]  


 


Albuterol HFA [Ventolin HFA 90 2 puff IH U9ORMEU #1 pump 11/08/18





mcg/actuation (8 g)]  


 


Albuterol Sulfate 5 mg IH PRN PRN #30 solution 11/08/18


 


Nebulizer and Compressor [[place] 1 each MC PRN PRN #1 each 11/08/18





Choice Nebulizer]  


 


predniSONE [predniSONE Tab] 40 mg PO DAILY #8 tab 11/08/18














- Allergies


Allergies/Adverse Reactions: 


                                    Allergies











Allergy/AdvReac Type Severity Reaction Status Date / Time


 


No Known Allergies Allergy   Verified 11/08/18 21:03














Review of Systems


Constitutional: Negative for: Fever, Chills, Weakness


Cardiovascular: Negative for: Chest Pain, Palpitations, Edema


Respiratory: Positive for: Shortness of Breath, Wheezing.  Negative for: Cough


Gastrointestinal: Negative for: Nausea, Vomiting, Abdominal Pain


Genitourinary Male: Negative for: Dysuria





Physical Exam





- Physical Exam


Appears: Positive for: Well, Non-toxic, No Acute Distress


Head Exam: Positive for: ATRAUMATIC


Skin: Positive for: Normal Color, Warm, Dry


Cardiovascular/Chest: Positive for: Regular Rate, Rhythm


Respiratory: Positive for: Wheezing.  Negative for: Accessory Muscle Use, Rales,

Rhonchi, Stridor


Gastrointestinal/Abdominal: Positive for: Soft.  Negative for: Tenderness


Extremity: Negative for: Calf Tenderness





- ECG


O2 Sat by Pulse Oximetry: 98





Medical Decision Making


Medical Decision Making: 





Pt with acute asthma exacerbation.  Pt given nebulizer treatments with 

resolution of wheezing. Pt states he feels that he is breathing better.  Pt with

nasal congestion and given sudafed for congestion.  Pt will follow up with PMD 

in one week for further evaluation.  Return parameters discussed with the 

patient and his wife.





Disposition





- Clinical Impression


Clinical Impression: 


 Asthma, Upper respiratory tract infection








- Disposition


Disposition Time: 23:43


Condition: IMPROVED


Additional Instructions: 


Take medications as prescribed. Follow up with primary medical doctor as needed.

Return to the emergency department if symptoms worsen or if new symptoms 

develop.


Prescriptions: 


Albuterol HFA [Ventolin HFA 90 mcg/actuation (8 g)] 2 puff IH Q7ETGSA #1 pump


Albuterol Sulfate 5 mg IH PRN PRN #30 solution


 PRN Reason: Shortness Of Breath


Nebulizer and Compressor [[place] Choice Nebulizer] 1 each MC PRN PRN #1 each


 PRN Reason: Shortness Of Breath


predniSONE [predniSONE Tab] 40 mg PO DAILY #8 tab


Forms:  [facility] (English)


Print Language: ENGLISH

## 2018-11-09 NOTE — RAD
Date of service: 



11/08/2018



HISTORY:

 possible admission 



COMPARISON:

10/09/2018. 



FINDINGS:



LUNGS:

No active pulmonary disease.



PLEURA:

No significant pleural effusion identified, no pneumothorax apparent.



CARDIOVASCULAR:

No atherosclerotic calcification present



 No radiographic findings to suggest acute or significant 

cardiovascular disease.



OSSEOUS STRUCTURES:

No significant abnormalities.



VISUALIZED UPPER ABDOMEN:

Normal.



OTHER FINDINGS:

None.



IMPRESSION:

No active disease. No significant interval change compared to the 

prior examination(s).

## 2018-12-06 ENCOUNTER — HOSPITAL ENCOUNTER (EMERGENCY)
Dept: HOSPITAL 14 - H.ER | Age: 48
Discharge: HOME | End: 2018-12-06
Payer: COMMERCIAL

## 2018-12-06 VITALS
DIASTOLIC BLOOD PRESSURE: 84 MMHG | HEART RATE: 86 BPM | RESPIRATION RATE: 18 BRPM | SYSTOLIC BLOOD PRESSURE: 134 MMHG | OXYGEN SATURATION: 98 % | TEMPERATURE: 98.6 F

## 2018-12-06 VITALS — BODY MASS INDEX: 38.9 KG/M2

## 2018-12-06 DIAGNOSIS — J45.901: Primary | ICD-10-CM

## 2018-12-06 DIAGNOSIS — J06.9: ICD-10-CM

## 2018-12-06 LAB
BASOPHILS # BLD AUTO: 0.1 K/UL (ref 0–0.2)
BASOPHILS NFR BLD: 1 % (ref 0–2)
BUN SERPL-MCNC: 16 MG/DL (ref 9–20)
CALCIUM SERPL-MCNC: 9.4 MG/DL (ref 8.4–10.2)
EOSINOPHIL # BLD AUTO: 0.3 K/UL (ref 0–0.7)
EOSINOPHIL NFR BLD: 3.5 % (ref 0–4)
ERYTHROCYTE [DISTWIDTH] IN BLOOD BY AUTOMATED COUNT: 13.2 % (ref 11.5–14.5)
GFR NON-AFRICAN AMERICAN: > 60
HGB BLD-MCNC: 14.1 G/DL (ref 12–18)
LYMPHOCYTES # BLD AUTO: 2.5 K/UL (ref 1–4.3)
LYMPHOCYTES NFR BLD AUTO: 32.9 % (ref 20–40)
MCH RBC QN AUTO: 31.1 PG (ref 27–31)
MCHC RBC AUTO-ENTMCNC: 34.2 G/DL (ref 33–37)
MCV RBC AUTO: 90.9 FL (ref 80–94)
MONOCYTES # BLD: 0.6 K/UL (ref 0–0.8)
MONOCYTES NFR BLD: 8.3 % (ref 0–10)
NEUTROPHILS # BLD: 4.1 K/UL (ref 1.8–7)
NEUTROPHILS NFR BLD AUTO: 54.3 % (ref 50–75)
NRBC BLD AUTO-RTO: 0.1 % (ref 0–0)
PLATELET # BLD: 269 K/UL (ref 130–400)
PMV BLD AUTO: 7.6 FL (ref 7.2–11.7)
RBC # BLD AUTO: 4.53 MIL/UL (ref 4.4–5.9)
WBC # BLD AUTO: 7.6 K/UL (ref 4.8–10.8)

## 2018-12-06 NOTE — ED PDOC
HPI: SOB/CHF/COPD


Time Seen by Provider: 12/06/18 04:52


Chief Complaint (Nursing): Cough, Cold, Congestion


Chief Complaint (Provider): Cough, Cold, Congestion


History Per: Patient


History/Exam Limitations: no limitations


Onset/Duration Of Symptoms: Days (x4)


Associated Symptoms: Chest Pain


Additional Complaint(s): 





47 y/o male with history of asthma presents to the ED for congestion, runny 

nose, and cough, onset x4 days ago. Patient reports that he also has chest pain 

when coughing but denies shortness of breath. Patient is an asthmatic and uses a

pump treatment but does not use any steroid medication. Patient notes that he 

recently ran out of his blood pressure medicine and is concerned that is blood 

pressure may be high. He has not followed up with his doctor who he sees in the 

clinic. Patient denies any other medications than his pump. 





Past Medical History


Reviewed: Historical Data


Vital Signs: 





                                Last Vital Signs











Temp  98.3 F   12/06/18 04:52


 


Pulse  87   12/06/18 04:52


 


Resp  16   12/06/18 04:52


 


BP  140/90   12/06/18 04:52


 


Pulse Ox  96   12/06/18 04:52














- Medical History


PMH: Asthma, Bronchitis, Depression, HTN, Hypercholesterolemia, Kidney Stones, 

Pulmonary Embolism, Chronic Pain (neck and back)


   Denies: Diabetes, Hepatitis, HIV, Chronic Kidney Disease, Seizures, Sexually 

Transmitted Disease





- Family History


Family History: States: Unknown Family Hx, Hypertension (mother, father)





- Immunization History


Hx Tetanus Toxoid Vaccination: Yes





- Home Medications


Home Medications: 


                                Ambulatory Orders











 Medication  Instructions  Recorded


 


Cyclobenzaprine [Flexeril] 10 mg PO BID PRN #10 tab 03/16/17


 


Acetaminophen with Codeine 1 tab PO Q6 PRN #10 tab 02/12/18





[Tylenol with Codeine #3 Tablet]  


 


Alprazolam [Xanax] 0.5 mg PO DAILY 03/12/18


 


Ciprofloxacin/Dexamethasone 1 - 2 drop OD BID PRN 03/12/18





[Ciprodex Otic]  


 


Hydrochlorothiazide [Microzide] 12.5 mg PO DAILY 03/12/18


 


Albuterol HFA [Ventolin HFA 90 2 puff IH Q6H PRN #60 puff 10/08/18





mcg/actuation (8 g)]  


 


Atorvastatin [Lipitor] 40 mg PO 10/10/18


 


Benzonatate [Tessalon Perle] 100 mg PO Q8H PRN #21 capsule 10/11/18


 


Escitalopram [Lexapro] 10 mg PO BID #60 tab 10/11/18


 


Fluticasone Propionate [Flonase] 1 spr DASHA BID #1 bottle 10/11/18


 


Levofloxacin [Levaquin] 500 mg PO DAILY #5 tablet 10/11/18


 


Pantoprazole [Protonix EC Tab] 40 mg PO DAILY #30 ect 10/11/18


 


Albuterol HFA [Ventolin HFA 90 2 puff IH [id number] #1 pump 11/08/18





mcg/actuation (8 g)]  


 


Albuterol Sulfate 5 mg IH PRN PRN #30 solution 11/08/18


 


Nebulizer and Compressor [[place] 1 each MC PRN PRN #1 each 11/08/18





Choice Nebulizer]  


 


predniSONE [predniSONE Tab] 40 mg PO DAILY #8 tab 11/08/18


 


Fluticasone/Salmeterol 250/50 1 puff IH Q12 #60 puff 12/06/18





[Advair Diskus 250/50]  


 


Losartan Potassium 50 mg PO DAILY #30 tablet 12/06/18


 


Methylprednisolone [Medrol Dose 4 mg PO DAILY #21 mg 12/06/18





Pack (21 tabs)]  


 


guaiFENesin/Dextromethorphan 1 tab PO Q12 #14 tab 12/06/18





[Mucinex--30 mg]  














- Allergies


Allergies/Adverse Reactions: 


                                    Allergies











Allergy/AdvReac Type Severity Reaction Status Date / Time


 


No Known Allergies Allergy   Verified 11/08/18 21:03














Wells Criteria for PE





- Wells Criteria for Pulmonary Embolism


Clinical Signs and Symptoms of DVT: No


P.E is #1 Diagnosis, or Equally Likely: No


Heart Rate >100: No


Immobilization at least 3 days;Surgery previous 4 weeks: No


Previous, objectively diagnosed PE or DVT: No


Hemoptysis: No


Malignancy w/treatment within 6 months, or palliative: No


Total Score: 0





Review of Systems


ROS Statement: Except As Marked, All Systems Reviewed And Found Negative


ENT: Positive for: Nose Discharge, Nose Congestion


Cardiovascular: Positive for: Chest Pain


Respiratory: Positive for: Cough.  Negative for: Shortness of Breath





Physical Exam





- Reviewed


Nursing Documentation Reviewed: Yes


Vital Signs Reviewed: Yes





- Physical Exam


Appears: Positive for: Non-toxic, No Acute Distress


Head Exam: Positive for: ATRAUMATIC, NORMOCEPHALIC


Skin: Positive for: Normal Color, Warm, Dry


Eye Exam: Positive for: EOMI, Normal appearance, PERRL


ENT: Positive for: Normal ENT Inspection


Neck: Positive for: Normal, Painless ROM, Supple


Cardiovascular/Chest: Positive for: Regular Rate, Rhythm.  Negative for: Murmur


Respiratory: Positive for: Normal Breath Sounds.  Negative for: Respiratory 

Distress


Gastrointestinal/Abdominal: Positive for: Normal Exam, Soft.  Negative for: 

Tenderness


Extremity: Positive for: Normal ROM.  Negative for: Pedal Edema, Deformity


Neurologic/Psych: Positive for: Alert, Oriented (x3).  Negative for: 

Motor/Sensory Deficits





- Laboratory Results


Result Diagrams: 


                                 12/06/18 05:37





                                 12/06/18 05:37





- ECG


ECG Rhythm: Positive for: Normal QRS, Normal ST Segment, Sinus Rhythm


Rate: 87


O2 Sat by Pulse Oximetry: 96 (RA)


Pulse Ox Interpretation: Normal





- Progress


Re-evaluation Time: 06:30


Condition: Re-examined, Improved





Medical Decision Making


Medical Decision Making: 





Time: 05:03


Initial Impression: cough and chest pain


Differential includes asthma, bronchitis, r/o ACS


Initial Plan: 


* EKG


* Duoneb


* Prednisone





05:30


Additional labs ordered.








05:52


Patient's CXR shows no active disease and is completely normal. Patient's EKG is

also normal at 87 bpm. 





06:30


Upon provider reevaluation patient is feeling better, is medically stable, and 

requires no further treatment in the ED at this time. Patient will be discharged

home. Counseling was provided and all questions were answered regarding 

diagnosis and need for follow up with PMD. There is agreement to discharge plan.

Return if symptoms persist or worsen.





--

--------------------------------------------------------------------------------


---------------


Scribe Attestation:


Documented by Aleksey Borden acting as a scribe for Abram De Anda MD.





Provider Scribe Attestation:


All medical record entries made by the Scribe were at my direction and 

personally dictated by me. I have reviewed the chart and agree that the record 

accurately reflects my personal performance of the history, physical exam, 

medical decision making, and the department course for this patient. I have also

personally directed, reviewed, and agree with the discharge instructions and 

disposition.





Disposition





- Clinical Impression


Clinical Impression: 


 Asthma exacerbation, URI (upper respiratory infection)








- Patient ED Disposition


Is Patient to be Admitted: No


Doctor Will See Patient In The: Office


Counseled Patient/Family Regarding: Studies Performed, Diagnosis, Need For 

Followup





- Disposition


Referrals: 


[facility] [Outside]


Disposition: Routine/Home


Disposition Time: 06:30


Condition: GOOD


Additional Instructions: 





THOMAS ZUÑIGA, thank you for letting us take care of you today. Your 

provider was Abram De Anda MD and you were treated for ASTHMA,CHEST 

DISCOMFORT. The emergency medical care you received today was directed at your 

acute symptoms. If you were prescribed any medication, please fill it and take 

as directed. It may take several days for your symptoms to resolve. Return to 

the Emergency Department if your symptoms worsen, do not improve, or if you have

any other problems.





Please contact your doctor or call one of the physicians/clinics you have been 

referred to that are listed on the Patient Visit Information form that is 

included in your discharge packet. Bring any paperwork you were given at 

discharge with you along with any medications you are taking to your follow up 

visit. Our treatment cannot replace ongoing medical care by a primary care 

provider outside of the emergency department.





Thank you for allowing the [facility] team to be part of your care today.








If you had an X-Ray or CT scan: A Radiologist will review the ED reading if any 

change in treatment is needed we will contact you.***





If you had a blood, urine, or wound culture: It will take several days for the 

results, if any change in treatment is needed we will contact you.***





If you had an STI test: It will take 48 hours for the results. Please call after

1 week if you have not heard back.***


Prescriptions: 


Fluticasone/Salmeterol 250/50 [Advair Diskus 250/50] 1 puff IH Q12 #60 puff


guaiFENesin/Dextromethorphan [Mucinex--30 mg] 1 tab PO Q12 #14 tab


Losartan Potassium 50 mg PO DAILY #30 tablet


Methylprednisolone [Medrol Dose Pack (21 tabs)] 4 mg PO DAILY #21 mg


Instructions:  Asthma in Adults


Print Language: Guyanese





MARA Risk Score for UA/NSTEMI





- MARA Risk Score


Age > 64: NO


3 or more CAD Risk Factors: NO


Known CAD (Stenosis greater than 50%): NO


Aspirin use in past 7 days: NO


Severe Angina: NO


EKG ST changes greater than 0.5mm: NO


Positive Cardiac Marker: NO


MARA Score: 0


% risk at 14 days of: all cause mortality, new or recurrent MI, or severe r

ecurrent ischemia requiring urgen revascularization: 5%

## 2018-12-06 NOTE — CARD
--------------- APPROVED REPORT --------------





Date of service: 12/06/2018



EKG Measurement

Heart [id number]

[id number]

[id number]

[id number]

[id number]



<Conclusion>

Normal sinus rhythm

Nonspecific ST and T wave abnormality

Abnormal ECG

## 2018-12-06 NOTE — RAD
Date of service: 



12/06/2018



PROCEDURE:  CHEST RADIOGRAPH, 1 VIEW



HISTORY:

chest pain



COMPARISON:

11/8/2018



FINDINGS:



LUNGS:

Clear.



PLEURA:

No pneumothorax or pleural fluid seen.



CARDIOVASCULAR:

 No aortic atherosclerotic calcification present. Normal.



OSSEOUS STRUCTURES:

No significant abnormalities.



VISUALIZED UPPER ABDOMEN:

Normal.



OTHER FINDINGS:

None. 



IMPRESSION:

No active disease.

## 2019-01-06 ENCOUNTER — HOSPITAL ENCOUNTER (EMERGENCY)
Dept: HOSPITAL 14 - H.ER | Age: 49
Discharge: HOME | End: 2019-01-06
Payer: COMMERCIAL

## 2019-01-06 VITALS
SYSTOLIC BLOOD PRESSURE: 129 MMHG | RESPIRATION RATE: 16 BRPM | OXYGEN SATURATION: 99 % | TEMPERATURE: 98.4 F | DIASTOLIC BLOOD PRESSURE: 76 MMHG | HEART RATE: 91 BPM

## 2019-01-06 VITALS — BODY MASS INDEX: 38.9 KG/M2
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
## 2019-01-06 DIAGNOSIS — S93.401A: Primary | ICD-10-CM
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
## 2019-01-07 NOTE — RAD
Date of service: 



01/06/2019



PROCEDURE:  Right Ankle Radiographs.



HISTORY:

 ankle injury 



COMPARISON:

None available.



FINDINGS:



BONES:

Normal. No fracture.  Achilles tendon insertion spur. 



JOINTS:

Normal. No osteoarthritis. Ankle mortise maintained. Talar dome intact



SOFT TISSUES:

Lateral soft tissue swelling without distal fibular abnormality. 



OTHER FINDINGS:

None.



IMPRESSION:

Soft tissue swelling without acute articular or osseous abnormality.

## 2019-03-03 ENCOUNTER — HOSPITAL ENCOUNTER (EMERGENCY)
Dept: HOSPITAL 14 - H.ER | Age: 49
Discharge: HOME | End: 2019-03-03
Payer: COMMERCIAL

## 2019-03-03 VITALS
OXYGEN SATURATION: 97 % | DIASTOLIC BLOOD PRESSURE: 79 MMHG | HEART RATE: 88 BPM | RESPIRATION RATE: 18 BRPM | SYSTOLIC BLOOD PRESSURE: 138 MMHG | TEMPERATURE: 97.8 F

## 2019-03-03 VITALS — BODY MASS INDEX: 38.9 KG/M2

## 2019-03-03 DIAGNOSIS — I10: ICD-10-CM

## 2019-03-03 DIAGNOSIS — E78.00: ICD-10-CM

## 2019-03-03 DIAGNOSIS — G89.29: ICD-10-CM

## 2019-03-03 DIAGNOSIS — J45.909: ICD-10-CM

## 2019-03-03 DIAGNOSIS — H60.92: Primary | ICD-10-CM

## 2019-03-03 DIAGNOSIS — Z87.891: ICD-10-CM

## 2019-03-03 DIAGNOSIS — M54.2: ICD-10-CM

## 2019-03-03 DIAGNOSIS — M54.9: ICD-10-CM

## 2019-03-03 DIAGNOSIS — Z86.59: ICD-10-CM

## 2019-03-03 DIAGNOSIS — Z87.442: ICD-10-CM

## 2019-03-03 NOTE — ED PDOC
HPI: General Adult


Time Seen by Provider: 03/03/19 16:12


Chief Complaint (Nursing): ENT Problem


Chief Complaint (Provider): Left Ear Pain / Swelling


History Per: Patient


History/Exam Limitations: no limitations


Onset/Duration Of Symptoms: Days (x2)


Current Symptoms Are (Timing): Still Present


Additional Complaint(s): 


48 year old male presents to the ED with wife for two days of progressively 

worsening left ear pain, discharge, and swelling associated with a tactile 

fever. As per wife, she put olive oil in his ear without relief and his last 

dose of Tylenol was yesterday evening before bed. Denies hearing changes, recent

swimming, diabetes, and taking any medications today.





PMD: Kassandra Perkins





Past Medical History


Reviewed: Historical Data, Nursing Documentation, Vital Signs


Vital Signs: 





                                Last Vital Signs











Temp  97.8 F   03/03/19 16:10


 


Pulse  88   03/03/19 16:10


 


Resp  18   03/03/19 16:10


 


BP  138/79   03/03/19 16:10


 


Pulse Ox  97   03/03/19 16:10














- Medical History


PMH: Asthma, Bronchitis, Depression, HTN, Hypercholesterolemia, Kidney Stones, 

Chronic Pain (neck and back)


   Denies: Diabetes, Hepatitis, HIV, Pulmonary Embolism, Chronic Kidney Disease,

Seizures, Sexually Transmitted Disease





- Surgical History


Other surgeries: right ankle





- Family History


Family History: States: Hypertension (mother, father)





- Social History


Current smoker - smoking cessation education provided: No


Ex-Smoker (has not smoked in the last 12 months): Yes


Alcohol: None





- Immunization History


Hx Tetanus Toxoid Vaccination: Yes





- Home Medications


Home Medications: 


                                Ambulatory Orders











 Medication  Instructions  Recorded


 


Cyclobenzaprine [Flexeril] 10 mg PO BID PRN #10 tab 03/16/17


 


Acetaminophen with Codeine 1 tab PO Q6 PRN #10 tab 02/12/18





[Tylenol with Codeine #3 Tablet]  


 


Alprazolam [Xanax] 0.5 mg PO DAILY 03/12/18


 


Ciprofloxacin/Dexamethasone 1 - 2 drop OD BID PRN 03/12/18





[Ciprodex Otic]  


 


Hydrochlorothiazide [Microzide] 12.5 mg PO DAILY 03/12/18


 


Albuterol HFA [Ventolin HFA 90 2 puff IH Q6H PRN #60 puff 10/08/18





mcg/actuation (8 g)]  


 


Atorvastatin [Lipitor] 40 mg PO 10/10/18


 


Benzonatate [Tessalon Perle] 100 mg PO Q8H PRN #21 capsule 10/11/18


 


Escitalopram [Lexapro] 10 mg PO BID #60 tab 10/11/18


 


Fluticasone Propionate [Flonase] 1 spr DASHA BID #1 bottle 10/11/18


 


Levofloxacin [Levaquin] 500 mg PO DAILY #5 tablet 10/11/18


 


Pantoprazole [Protonix EC Tab] 40 mg PO DAILY #30 ect 10/11/18


 


Albuterol HFA [Ventolin HFA 90 2 puff IH Q2AUAWS #1 pump 11/08/18





mcg/actuation (8 g)]  


 


Albuterol Sulfate 5 mg IH PRN PRN #30 solution 11/08/18


 


Nebulizer and Compressor [[place] 1 each MC PRN PRN #1 each 11/08/18





Choice Nebulizer]  


 


predniSONE [predniSONE Tab] 40 mg PO DAILY #8 tab 11/08/18


 


Fluticasone/Salmeterol 250/50 1 puff IH Q12 #60 puff 12/06/18





[Advair Diskus 250/50]  


 


Losartan Potassium 50 mg PO DAILY #30 tablet 12/06/18


 


Methylprednisolone [Medrol Dose 4 mg PO DAILY #21 mg 12/06/18





Pack (21 tabs)]  


 


guaiFENesin/Dextromethorphan 1 tab PO Q12 #14 tab 12/06/18





[Mucinex--30 mg]  


 


Naproxen 375 mg PO Q8 PRN #21 tablet 01/06/19


 


Ciprofloxacin/Dexamethasone 4 drop AS BID #1 bottle 03/03/19





[Ciprodex Otic]  














- Allergies


Allergies/Adverse Reactions: 


                                    Allergies











Allergy/AdvReac Type Severity Reaction Status Date / Time


 


No Known Allergies Allergy   Verified 01/06/19 20:08














Review of Systems


ROS Statement: Except As Marked, All Systems Reviewed And Found Negative


Constitutional: Positive for: Fever (tactile)


ENT: Positive for: Ear Pain (discharge and swelling to left ear).  Negative for:

Other (changes in hearing)





Physical Exam





- Reviewed


Nursing Documentation Reviewed: Yes


Vital Signs Reviewed: Yes





- Physical Exam


Appears: Positive for: No Acute Distress


Head Exam: Positive for: ATRAUMATIC, NORMAL INSPECTION, NORMOCEPHALIC


Skin: Positive for: Normal Color, Warm, Dry.  Negative for: Rash


Eye Exam: Positive for: Normal appearance, EOMI, PERRL


ENT: Positive for: TM Is/Are (left TM: unable to visualize; right TM: non-

bulging, non-erythematous), Other (Left ear canal: erythematous and mildly 

edematous, but no external auditory canal swelling or erythema; Right ear canal:

unremarkable, clear; no mastoid swelling or tenderness bilaterally).  Negative 

for: Pharyngeal Erythema, Tonsillar Exudate, Tonsillar Swelling


Lymphatic: Positive for: Adenopathy (left pre-oracular)





- ECG


O2 Sat by Pulse Oximetry: 97 (RA)


Pulse Ox Interpretation: Normal





Medical Decision Making


Medical Decision Making: 


Time: 1620


Initial Impression: otitis externa


Initial Plan:


--Naproxen 500mg PO


--Patient and wife advised to take Tylenol/ Motrin for pain and do frequent 

temperature checks. Patient stable for discharge.





--------

--------------------------------------------------------------------------------


--------- 


Scribe Attestation:


Documented by Mary Anne Ignacio, acting as a scribe for García Begum PA-C.


Provider Scribe Attestation:


All medical record entries made by the Scribe were at my direction and 

personally dictated by me. I have reviewed the chart and agree that the record 

accurately reflects my personal performance of the history, physical exam, 

medical decision making, and the department course for this patient. I have also

personally directed, reviewed, and agree with the discharge instructions and 

disposition.





Disposition





- Clinical Impression


Clinical Impression: 


 Otitis externa








- Patient ED Disposition


Is Patient to be Admitted: No





- Disposition


Referrals: 


Kassandra Perkins MD [Family Provider] - 


Disposition: Routine/Home


Disposition Time: 16:22


Condition: STABLE


Additional Instructions: 


FOLLOW UP WITH DR. PERKINS FOR FURTHER EVALUATION


RETURN TO ED IMMEDIATELY IF SYMPTOMS WORSEN





THOMAS ZUÑIGA, thank you for letting us take care of you today. Your 

provider was Nadia Gomez MD and you were treated for EARACHE,LT SIDE 

FACE SWELLING. The emergency medical care you received today was directed at 

your acute symptoms. If you were prescribed any medication, please fill it and 

take as directed. It may take several days for your symptoms to resolve. Return 

to the Emergency Department if your symptoms worsen, do not improve, or if you 

have any other problems.





Please contact your doctor or call one of the physicians/clinics you have been 

referred to that are listed on the Patient Visit Information form that is 

included in your discharge packet. Bring any paperwork you were given at 

discharge with you along with any medications you are taking to your follow up 

visit. Our treatment cannot replace ongoing medical care by a primary care 

provider outside of the emergency department.





Thank you for allowing the [facility] team to be part of your care today.








If you had an X-Ray or CT scan: A Radiologist will review the ED reading if any 

change in treatment is needed we will contact you.***





If you had a blood, urine, or wound culture: It will take several days for the 

results, if any change in treatment is needed we will contact you.***





If you had an STI test: It will take 48 hours for the results. Please call after

1 week if you have not heard back.***


Prescriptions: 


Ciprofloxacin/Dexamethasone [Ciprodex Otic] 4 drop AS BID #1 bottle


Instructions:  Outer Ear Infection (DC)


Forms:  [employer] (English)


Print Language: ENGLISH